# Patient Record
Sex: FEMALE | Race: WHITE | NOT HISPANIC OR LATINO | Employment: OTHER | URBAN - METROPOLITAN AREA
[De-identification: names, ages, dates, MRNs, and addresses within clinical notes are randomized per-mention and may not be internally consistent; named-entity substitution may affect disease eponyms.]

---

## 2023-02-27 LAB — FECAL OCCULT BLD IMMUNOASSAY: NEGATIVE

## 2023-08-19 ASSESSMENT — ENCOUNTER SYMPTOMS
BACK PAIN: 1
NUMBNESS: 0
TINGLING: 0
PARESTHESIAS: 0
WEAKNESS: 0
DYSURIA: 0
FEVER: 0
WEIGHT LOSS: 0
BOWEL INCONTINENCE: 0
PERIANAL NUMBNESS: 0
ABDOMINAL PAIN: 0
HEADACHES: 0
PARESIS: 0
LEG PAIN: 0

## 2023-08-21 ENCOUNTER — APPOINTMENT (OUTPATIENT)
Dept: PRIMARY CARE | Facility: CLINIC | Age: 68
End: 2023-08-21
Payer: MEDICARE

## 2023-08-21 ASSESSMENT — ENCOUNTER SYMPTOMS
HEADACHES: 0
WEIGHT LOSS: 0
ABDOMINAL PAIN: 0
PARESIS: 0
FEVER: 0
WEAKNESS: 0
DYSURIA: 0
BOWEL INCONTINENCE: 0
BACK PAIN: 1
LEG PAIN: 0
NUMBNESS: 0
PERIANAL NUMBNESS: 0
TINGLING: 0
PARESTHESIAS: 0

## 2023-08-22 PROBLEM — C50.919 BREAST CANCER (MULTI): Status: ACTIVE | Noted: 2023-08-22

## 2023-08-22 PROBLEM — E78.5 HYPERLIPIDEMIA: Status: ACTIVE | Noted: 2023-08-22

## 2023-08-22 PROBLEM — K21.9 GERD (GASTROESOPHAGEAL REFLUX DISEASE): Status: ACTIVE | Noted: 2023-08-22

## 2023-08-22 RX ORDER — LETROZOLE 2.5 MG/1
TABLET, FILM COATED ORAL
COMMUNITY
Start: 2023-07-04 | End: 2024-03-13 | Stop reason: SDUPTHER

## 2023-08-22 RX ORDER — ATORVASTATIN CALCIUM 10 MG/1
1 TABLET, FILM COATED ORAL DAILY
COMMUNITY
Start: 2022-08-19 | End: 2023-08-30

## 2023-08-22 RX ORDER — FLUTICASONE PROPIONATE 50 MCG
SPRAY, SUSPENSION (ML) NASAL
COMMUNITY
Start: 2023-02-20 | End: 2023-08-31 | Stop reason: ALTCHOICE

## 2023-08-22 RX ORDER — OMEPRAZOLE 20 MG/1
CAPSULE, DELAYED RELEASE ORAL
COMMUNITY
Start: 2022-08-19 | End: 2023-08-30

## 2023-08-24 ASSESSMENT — ENCOUNTER SYMPTOMS
FEVER: 0
HEADACHES: 0
LEG PAIN: 0
WEIGHT LOSS: 0
PARESIS: 0
BACK PAIN: 1
DYSURIA: 0
BOWEL INCONTINENCE: 0
ABDOMINAL PAIN: 0
TINGLING: 0
PERIANAL NUMBNESS: 0
NUMBNESS: 0
WEAKNESS: 0
PARESTHESIAS: 0

## 2023-08-25 ENCOUNTER — APPOINTMENT (OUTPATIENT)
Dept: PRIMARY CARE | Facility: CLINIC | Age: 68
End: 2023-08-25
Payer: MEDICARE

## 2023-08-29 DIAGNOSIS — K21.9 GASTROESOPHAGEAL REFLUX DISEASE, UNSPECIFIED WHETHER ESOPHAGITIS PRESENT: Primary | ICD-10-CM

## 2023-08-29 DIAGNOSIS — E78.5 HYPERLIPIDEMIA, UNSPECIFIED HYPERLIPIDEMIA TYPE: ICD-10-CM

## 2023-08-29 NOTE — TELEPHONE ENCOUNTER
Requested Prescriptions     Pending Prescriptions Disp Refills    omeprazole (PriLOSEC) 20 mg DR capsule [Pharmacy Med Name: OMEPRAZOL RX CAP 20MG] 90 capsule 1     Sig: TAKE 1 CAPSULE DAILY BEFOREA MEAL    atorvastatin (Lipitor) 10 mg tablet [Pharmacy Med Name: ATORVASTATIN TAB 10MG] 90 tablet 1     Sig: TAKE 1 TABLET DAILY

## 2023-08-29 NOTE — PROGRESS NOTES
Subjective   Patient ID: Mariel Mckeon is a 68 y.o. female who presents for Follow-up (6 month follow up ).    Back Pain  This is a recurrent problem. The current episode started more than 1 year ago. The problem occurs daily. The problem is unchanged. The pain is present in the gluteal, lumbar spine and thoracic spine. The quality of the pain is described as aching. The pain is at a severity of 3/10. The pain is Worse during the day. The symptoms are aggravated by bending and position. Pertinent negatives include no abdominal pain, bladder incontinence, bowel incontinence, chest pain, dysuria, fever, headaches, leg pain, numbness, paresis, paresthesias, pelvic pain, perianal numbness, tingling, weakness or weight loss. Risk factors include history of cancer, lack of exercise, poor posture and sedentary lifestyle.        Had right lower back pian with right LE radiculopathy. She used lidocaine patch which resolved her pain.     Following with oncology for breast cancer     Review of Systems   Constitutional:  Negative for fever and weight loss.   Cardiovascular:  Negative for chest pain.   Gastrointestinal:  Negative for abdominal pain and bowel incontinence.   Genitourinary:  Negative for bladder incontinence, dysuria and pelvic pain.   Musculoskeletal:  Positive for back pain.   Neurological:  Negative for tingling, weakness, numbness, headaches and paresthesias.       Objective   There were no vitals taken for this visit.    Physical Exam  Constitutional:       Appearance: Normal appearance.   HENT:      Head: Normocephalic and atraumatic.   Cardiovascular:      Rate and Rhythm: Normal rate and regular rhythm.      Heart sounds: Normal heart sounds. No murmur heard.     No gallop.   Pulmonary:      Effort: Pulmonary effort is normal. No respiratory distress.      Breath sounds: No wheezing or rales.   Skin:     General: Skin is warm and dry.      Findings: No rash.   Neurological:      Mental Status: She is  alert and oriented to person, place, and time. Mental status is at baseline.   Psychiatric:         Mood and Affect: Mood normal.         Behavior: Behavior normal.         Assessment/Plan        #Breast cancer   -Following with oncology. cont Letrozole. Mamm wnl 1/23/23    #Prediabetes  -Check A1c     #HLD  -Cont atorvastatin 10mg daily.     #GERD  -Cont omeprazole 20mg daily         Influenza: Never   COVID: x2  Prevnar 13: Never  Pneumovax 23: Never  Shingrix: Never  Mamm: 1/23/23  DEXA: 8/2/23 ostepenia  Pap: s/p hysterectomy   Colorectal ca: 2/23/23-FIT  Lung ca screening: NI

## 2023-08-30 RX ORDER — ATORVASTATIN CALCIUM 10 MG/1
10 TABLET, FILM COATED ORAL DAILY
Qty: 90 TABLET | Refills: 1 | Status: SHIPPED | OUTPATIENT
Start: 2023-08-30 | End: 2024-01-22

## 2023-08-30 RX ORDER — OMEPRAZOLE 20 MG/1
CAPSULE, DELAYED RELEASE ORAL
Qty: 90 CAPSULE | Refills: 1 | Status: SHIPPED | OUTPATIENT
Start: 2023-08-30 | End: 2024-01-22

## 2023-08-31 ENCOUNTER — OFFICE VISIT (OUTPATIENT)
Dept: PRIMARY CARE | Facility: CLINIC | Age: 68
End: 2023-08-31
Payer: MEDICARE

## 2023-08-31 VITALS
OXYGEN SATURATION: 97 % | DIASTOLIC BLOOD PRESSURE: 74 MMHG | HEART RATE: 74 BPM | SYSTOLIC BLOOD PRESSURE: 147 MMHG | HEIGHT: 67 IN | BODY MASS INDEX: 26.68 KG/M2 | WEIGHT: 170 LBS

## 2023-08-31 DIAGNOSIS — R73.03 PREDIABETES: Primary | ICD-10-CM

## 2023-08-31 DIAGNOSIS — M85.80 OSTEOPENIA, UNSPECIFIED LOCATION: ICD-10-CM

## 2023-08-31 PROCEDURE — 1036F TOBACCO NON-USER: CPT | Performed by: INTERNAL MEDICINE

## 2023-08-31 PROCEDURE — 1159F MED LIST DOCD IN RCRD: CPT | Performed by: INTERNAL MEDICINE

## 2023-08-31 PROCEDURE — 99214 OFFICE O/P EST MOD 30 MIN: CPT | Performed by: INTERNAL MEDICINE

## 2023-08-31 PROCEDURE — 1160F RVW MEDS BY RX/DR IN RCRD: CPT | Performed by: INTERNAL MEDICINE

## 2023-08-31 RX ORDER — VIT C/E/ZN/COPPR/LUTEIN/ZEAXAN 250MG-90MG
25 CAPSULE ORAL DAILY
COMMUNITY

## 2023-08-31 RX ORDER — CHOLECALCIFEROL (VITAMIN D3) 25 MCG
1000 TABLET,CHEWABLE ORAL DAILY
COMMUNITY

## 2023-08-31 ASSESSMENT — ENCOUNTER SYMPTOMS
LEG PAIN: 0
NUMBNESS: 0
BACK PAIN: 1
PERIANAL NUMBNESS: 0
ABDOMINAL PAIN: 0
TINGLING: 0
HEADACHES: 0
DYSURIA: 0
BOWEL INCONTINENCE: 0
PARESTHESIAS: 0
WEAKNESS: 0
FEVER: 0
WEIGHT LOSS: 0
PARESIS: 0

## 2023-08-31 ASSESSMENT — PATIENT HEALTH QUESTIONNAIRE - PHQ9
1. LITTLE INTEREST OR PLEASURE IN DOING THINGS: NOT AT ALL
2. FEELING DOWN, DEPRESSED OR HOPELESS: NOT AT ALL
SUM OF ALL RESPONSES TO PHQ9 QUESTIONS 1 AND 2: 0

## 2023-09-05 ENCOUNTER — LAB (OUTPATIENT)
Dept: LAB | Facility: LAB | Age: 68
End: 2023-09-05
Payer: MEDICARE

## 2023-09-05 DIAGNOSIS — R73.03 PREDIABETES: ICD-10-CM

## 2023-09-05 DIAGNOSIS — M85.80 OSTEOPENIA, UNSPECIFIED LOCATION: ICD-10-CM

## 2023-09-05 LAB
ANION GAP IN SER/PLAS: 13 MMOL/L (ref 10–20)
CALCIUM (MG/DL) IN SER/PLAS: 10.1 MG/DL (ref 8.6–10.3)
CARBON DIOXIDE, TOTAL (MMOL/L) IN SER/PLAS: 26 MMOL/L (ref 21–32)
CHLORIDE (MMOL/L) IN SER/PLAS: 108 MMOL/L (ref 98–107)
CREATININE (MG/DL) IN SER/PLAS: 0.94 MG/DL (ref 0.5–1.05)
GFR FEMALE: 66 ML/MIN/1.73M2
GLUCOSE (MG/DL) IN SER/PLAS: 105 MG/DL (ref 74–99)
POTASSIUM (MMOL/L) IN SER/PLAS: 4.3 MMOL/L (ref 3.5–5.3)
SODIUM (MMOL/L) IN SER/PLAS: 143 MMOL/L (ref 136–145)
UREA NITROGEN (MG/DL) IN SER/PLAS: 11 MG/DL (ref 6–23)

## 2023-09-05 PROCEDURE — 80048 BASIC METABOLIC PNL TOTAL CA: CPT

## 2023-09-05 PROCEDURE — 36415 COLL VENOUS BLD VENIPUNCTURE: CPT

## 2023-09-05 PROCEDURE — 82306 VITAMIN D 25 HYDROXY: CPT

## 2023-09-05 PROCEDURE — 83036 HEMOGLOBIN GLYCOSYLATED A1C: CPT

## 2023-09-06 LAB
CALCIDIOL (25 OH VITAMIN D3) (NG/ML) IN SER/PLAS: 65 NG/ML
ESTIMATED AVERAGE GLUCOSE FOR HBA1C: 126 MG/DL
HEMOGLOBIN A1C/HEMOGLOBIN TOTAL IN BLOOD: 6 %

## 2023-09-21 VITALS — WEIGHT: 169.09 LBS | HEIGHT: 66 IN | BODY MASS INDEX: 27.18 KG/M2

## 2023-09-21 DIAGNOSIS — C50.112 MALIGNANT NEOPLASM OF CENTRAL PORTION OF LEFT BREAST IN FEMALE, ESTROGEN RECEPTOR POSITIVE (MULTI): Primary | ICD-10-CM

## 2023-09-21 DIAGNOSIS — Z17.0 MALIGNANT NEOPLASM OF CENTRAL PORTION OF LEFT BREAST IN FEMALE, ESTROGEN RECEPTOR POSITIVE (MULTI): Primary | ICD-10-CM

## 2023-10-09 ENCOUNTER — LAB (OUTPATIENT)
Dept: LAB | Facility: LAB | Age: 68
End: 2023-10-09
Payer: MEDICARE

## 2023-10-09 DIAGNOSIS — Z17.0 MALIGNANT NEOPLASM OF CENTRAL PORTION OF LEFT BREAST IN FEMALE, ESTROGEN RECEPTOR POSITIVE (MULTI): ICD-10-CM

## 2023-10-09 DIAGNOSIS — C50.112 MALIGNANT NEOPLASM OF CENTRAL PORTION OF LEFT BREAST IN FEMALE, ESTROGEN RECEPTOR POSITIVE (MULTI): ICD-10-CM

## 2023-10-09 LAB — CANCER AG27-29 SERPL-ACNC: 48.3 U/ML (ref 0–38.6)

## 2023-10-09 PROCEDURE — 36415 COLL VENOUS BLD VENIPUNCTURE: CPT

## 2023-10-09 PROCEDURE — 86300 IMMUNOASSAY TUMOR CA 15-3: CPT

## 2023-10-10 ENCOUNTER — TELEPHONE (OUTPATIENT)
Dept: HEMATOLOGY/ONCOLOGY | Facility: CLINIC | Age: 68
End: 2023-10-10
Payer: MEDICARE

## 2023-12-29 ENCOUNTER — APPOINTMENT (OUTPATIENT)
Dept: RADIOLOGY | Facility: HOSPITAL | Age: 68
End: 2023-12-29
Payer: MEDICARE

## 2023-12-29 ENCOUNTER — HOSPITAL ENCOUNTER (EMERGENCY)
Facility: HOSPITAL | Age: 68
Discharge: HOME | End: 2023-12-29
Payer: MEDICARE

## 2023-12-29 VITALS
RESPIRATION RATE: 17 BRPM | WEIGHT: 169 LBS | TEMPERATURE: 97.5 F | SYSTOLIC BLOOD PRESSURE: 148 MMHG | DIASTOLIC BLOOD PRESSURE: 78 MMHG | BODY MASS INDEX: 27.16 KG/M2 | HEART RATE: 74 BPM | OXYGEN SATURATION: 97 % | HEIGHT: 66 IN

## 2023-12-29 DIAGNOSIS — M54.41 RIGHT-SIDED LOW BACK PAIN WITH RIGHT-SIDED SCIATICA, UNSPECIFIED CHRONICITY: Primary | ICD-10-CM

## 2023-12-29 LAB
FLUAV RNA RESP QL NAA+PROBE: NOT DETECTED
FLUBV RNA RESP QL NAA+PROBE: NOT DETECTED
SARS-COV-2 RNA RESP QL NAA+PROBE: NOT DETECTED

## 2023-12-29 PROCEDURE — 99284 EMERGENCY DEPT VISIT MOD MDM: CPT | Mod: 25 | Performed by: NURSE PRACTITIONER

## 2023-12-29 PROCEDURE — 87636 SARSCOV2 & INF A&B AMP PRB: CPT | Performed by: NURSE PRACTITIONER

## 2023-12-29 PROCEDURE — 99283 EMERGENCY DEPT VISIT LOW MDM: CPT | Mod: 25 | Performed by: NURSE PRACTITIONER

## 2023-12-29 PROCEDURE — 2500000002 HC RX 250 W HCPCS SELF ADMINISTERED DRUGS (ALT 637 FOR MEDICARE OP, ALT 636 FOR OP/ED): Performed by: NURSE PRACTITIONER

## 2023-12-29 PROCEDURE — 99284 EMERGENCY DEPT VISIT MOD MDM: CPT

## 2023-12-29 PROCEDURE — 2500000004 HC RX 250 GENERAL PHARMACY W/ HCPCS (ALT 636 FOR OP/ED): Performed by: NURSE PRACTITIONER

## 2023-12-29 PROCEDURE — 71046 X-RAY EXAM CHEST 2 VIEWS: CPT | Mod: FOREIGN READ | Performed by: RADIOLOGY

## 2023-12-29 PROCEDURE — 71046 X-RAY EXAM CHEST 2 VIEWS: CPT

## 2023-12-29 RX ORDER — DIAZEPAM 5 MG/1
5 TABLET ORAL ONCE
Status: COMPLETED | OUTPATIENT
Start: 2023-12-29 | End: 2023-12-29

## 2023-12-29 RX ORDER — KETOROLAC TROMETHAMINE 30 MG/ML
30 INJECTION, SOLUTION INTRAMUSCULAR; INTRAVENOUS ONCE
Status: COMPLETED | OUTPATIENT
Start: 2023-12-29 | End: 2023-12-29

## 2023-12-29 RX ORDER — NAPROXEN 500 MG/1
500 TABLET ORAL 2 TIMES DAILY PRN
Qty: 30 TABLET | Refills: 0 | Status: SHIPPED | OUTPATIENT
Start: 2023-12-29 | End: 2024-01-13

## 2023-12-29 RX ORDER — ORPHENADRINE CITRATE 100 MG/1
100 TABLET, EXTENDED RELEASE ORAL 2 TIMES DAILY PRN
Qty: 20 TABLET | Refills: 0 | Status: SHIPPED | OUTPATIENT
Start: 2023-12-29 | End: 2024-01-08

## 2023-12-29 RX ADMIN — KETOROLAC TROMETHAMINE 30 MG: 30 INJECTION, SOLUTION INTRAMUSCULAR at 10:11

## 2023-12-29 RX ADMIN — DIAZEPAM 5 MG: 5 TABLET ORAL at 10:11

## 2023-12-29 ASSESSMENT — LIFESTYLE VARIABLES
EVER FELT BAD OR GUILTY ABOUT YOUR DRINKING: NO
REASON UNABLE TO ASSESS: NO
HAVE PEOPLE ANNOYED YOU BY CRITICIZING YOUR DRINKING: NO
EVER HAD A DRINK FIRST THING IN THE MORNING TO STEADY YOUR NERVES TO GET RID OF A HANGOVER: NO
HAVE YOU EVER FELT YOU SHOULD CUT DOWN ON YOUR DRINKING: NO

## 2023-12-29 ASSESSMENT — COLUMBIA-SUICIDE SEVERITY RATING SCALE - C-SSRS
6. HAVE YOU EVER DONE ANYTHING, STARTED TO DO ANYTHING, OR PREPARED TO DO ANYTHING TO END YOUR LIFE?: NO
1. IN THE PAST MONTH, HAVE YOU WISHED YOU WERE DEAD OR WISHED YOU COULD GO TO SLEEP AND NOT WAKE UP?: NO
2. HAVE YOU ACTUALLY HAD ANY THOUGHTS OF KILLING YOURSELF?: NO

## 2023-12-29 ASSESSMENT — PAIN SCALES - GENERAL: PAINLEVEL_OUTOF10: 8

## 2023-12-29 ASSESSMENT — PAIN - FUNCTIONAL ASSESSMENT: PAIN_FUNCTIONAL_ASSESSMENT: 0-10

## 2023-12-29 NOTE — ED PROVIDER NOTES
Emergency Department Encounter  [facility] EMERGENCY MEDICINE    Patient: Mariel Mckeon  MRN: [id number]  : 1955  Date of Evaluation: 2023  ED Provider: ALYSSA Lopez      Chief Complaint       Chief Complaint   Patient presents with    Back Pain     Right sided low back/buttock pain after wiping her bottom on the toilet yesterday. Has been taking Tylenol and using heating pad without relief.      [place]    (Location/Symptom, Timing/Onset, Context/Setting, Quality, Duration, Modifying Factors, Severity) Note limiting factors.   Limitations to History: none  Historian: self  Records reviewed: EMR inpatient and outpatient notes, Care Everywhere      Mariel Mckeon is a 68 y.o. female who presents to the emergency department complaining of right lower back pain, has a history of sciatica.  States that she noticed the area yesterday when she was on the toilet trying to wipe her bottom.  Noticed it more today, states that she went to the bathroom and had sharp shooting pain to the right buttocks going down the leg.  Worse with movement.  Took 2 Tylenol.  States that she got warm and flushed due to the pain and had to lay down.  Denies any loss of consciousness.  Denies any chest pain, shortness of breath, abdominal pain, nausea, vomiting.  Denies any changes in bowel or bladder.  Does have a history of breast cancer, not currently undergoing any chemotherapy or radiation, is on oral medication since 2016.  Denies any midline back pain.  Denies any paresthesias, weakness.    ROS:     Review of Systems  14 systems reviewed and otherwise acutely negative except as in the [place].          Past History   History reviewed. No pertinent past medical history.  Past Surgical History:   Procedure Laterality Date    OTHER SURGICAL HISTORY  2022    Hysterectomy    OTHER SURGICAL HISTORY  2022    Tubal ligation    OTHER SURGICAL HISTORY  2022    Lumpectomy    OTHER SURGICAL  HISTORY  08/19/2022    Wrist surgery    OTHER SURGICAL HISTORY  08/19/2022    Eye surgery     Social History     Socioeconomic History    Marital status:      Spouse name: None    Number of children: None    Years of education: None    Highest education level: None   Occupational History    None   Tobacco Use    Smoking status: Never    Smokeless tobacco: Never   Vaping Use    Vaping Use: Never used   Substance and Sexual Activity    Alcohol use: Never    Drug use: Never    Sexual activity: None   Other Topics Concern    None   Social History Narrative    None     Social Determinants of Health     Financial Resource Strain: Not on file   Food Insecurity: Not on file   Transportation Needs: Not on file   Physical Activity: Not on file   Stress: Not on file   Social Connections: Not on file   Intimate Partner Violence: Not on file   Housing Stability: Not on file       Medications/Allergies     Previous Medications    ATORVASTATIN (LIPITOR) 10 MG TABLET    TAKE 1 TABLET DAILY    CALCIUM CARBONATE-VIT D3-MIN (CITRICAL & MINERALS + VIT D) 600 MG CALCIUM- 200 UNIT TABLET    Take 1 tablet by mouth once daily.    CHOLECALCIFEROL (VITAMIN D-3) 25 MCG (1000 UT) CAPSULE    Take 1 capsule (25 mcg) by mouth once daily.    CYANOCOBALAMIN, VITAMIN B-12, 1,000 MCG CAPSULE    Take 1,000 mcg by mouth once daily.    LETROZOLE (FEMARA) 2.5 MG TABLET        MULTIVIT-MIN/IRON/FOLIC/ADT641 (HAIR, SKIN AND NAILS ADVANCED ORAL)        OLMESARTAN (BENICAR) 20 MG TABLET    Take 1 tablet (20 mg) by mouth once daily.    OMEPRAZOLE (PRILOSEC) 20 MG DR CAPSULE    TAKE 1 CAPSULE DAILY BEFOREA MEAL     Allergies   Allergen Reactions    Amoxicillin-Pot Clavulanate Unknown    Triamcinolone Acetonide Unknown        Physical Exam       ED Triage Vitals   Temp Pulse Resp BP   -- -- -- --      SpO2 Temp src Heart Rate Source Patient Position   -- -- -- --      BP Location FiO2 (%)     -- --         Physical Exam    GENERAL:  The patient appears  nourished and normally developed. Vital signs as documented.     HEENT:  Head normocephalic, atraumatic, EOMs intact, PERRLA, Mucous membranes moist. Nares patent without copious rhinorrhea.  No lymphadenopathy.    PULMONARY:  Lungs are clear to auscultation, without any respiratory distress. Able to speak full sentences, no accessory muscle use    CARDIAC:   Normal rate. No murmurs, rubs or gallops    ABDOMEN:  Soft, non distended, non tender, BS positive x 4 quadrants, No rebound or guarding, no peritoneal signs, no CVA tenderness, no masses or organomegaly    MUSCULOSKELETAL:   Able to ambulate, Non edematous, with no obvious deformities. Pulses intact distal    SKIN:   Good color, with no significant rashes.  No pallor.    NEURO:  No obvious neurological deficits, normal sensation and strength bilaterally.  Able to follow commands, NIH 0, CN 2-12 intact.        Diagnostics   Labs:  Labs Reviewed   SARS-COV-2 AND INFLUENZA A/B PCR - Normal       Result Value    Flu A Result Not Detected      Flu B Result Not Detected      Coronavirus 2019, PCR Not Detected      Narrative:     This assay has received FDA Emergency Use Authorization (EUA) and  is only authorized for the duration of time that circumstances exist to justify the authorization of the emergency use of in vitro diagnostic tests for the detection of SARS-CoV-2 virus and/or diagnosis of COVID-19 infection under section 564(b)(1) of the Act, 21 U.S.C. 360bbb-3(b)(1). Testing for SARS-CoV-2 is only recommended for patients who meet current clinical and/or epidemiological criteria as defined by federal, state, or local public health directives. This assay is an in vitro diagnostic nucleic acid amplification test for the qualitative detection of SARS-CoV-2, Influenza A, and Influenza B from nasopharyngeal specimens and has been validated for use at [facility]. Negative results do not preclude COVID-19 infections or Influenza A/B  "infections, and should not be used as the sole basis for diagnosis, treatment, or other management decisions. If Influenza A/B and RSV PCR results are negative, testing for Parainfluenza virus, Adenovirus and Metapneumovirus is routinely performed for [facility] pediatric oncology and intensive care inpatients, and is available on other patients by placing an add-on request.    URINALYSIS WITH REFLEX CULTURE AND MICROSCOPIC    Narrative:     The following orders were created for panel order Urinalysis with Reflex Culture and Microscopic.  Procedure                               Abnormality         Status                     ---------                               -----------         ------                     Urinalysis with Reflex C...[[id number]]                                                 Extra Urine Gray Tube[[id number]]                                                         Please view results for these tests on the individual orders.   URINALYSIS WITH REFLEX CULTURE AND MICROSCOPIC   EXTRA URINE GRAY TUBE     Radiographs:  XR chest 2 views   Final Result   No acute process.   Signed by Fransico Glover MD                Assessment   In brief, Mariel Mkceon is a 68 y.o. female who presented to the emergency department with right lower back pain, worse with movement, worse with moving the right leg with a history of sciatica    Plan   Toradol, Valium    Differentials   Sciatica  Lumbar strain  Fracture    ED Course     Diagnoses as of 12/29/23 1329   Right-sided low back pain with right-sided sciatica, unspecified chronicity       Visit Vitals  /73   Pulse 100   Temp 36.4 °C (97.5 °F)   Resp 18   Ht 1.676 m (5' 6\")   Wt 76.7 kg (169 lb)   SpO2 94%   BMI 27.28 kg/m²   Smoking Status Never   BSA 1.89 m²       Medications   ketorolac (Toradol) injection 30 mg (30 mg intramuscular Given 12/29/23 1011)   diazePAM (Valium) tablet 5 mg (5 mg oral Given 12/29/23 1011)       Plan of care discussed, patient is stable " appearing, given Valium and Toradol with improvement of symptoms.  On reevaluation patient reports improvement but is also complaining of cough for the last few days, chest x-ray was ordered as well as COVID swab and flu swab which were negative.  Patient will be discharged home in stable condition, given a prescription for NSAIDs, muscle relaxants, educated on any worsening signs and symptoms to return to the emergency department, patient is agreeable to above plan and is stable for discharge home      Final Impression      1. Right-sided low back pain with right-sided sciatica, unspecified chronicity          DISPOSITION  Disposition: Discharge  Patient condition is: Stable    Comment: Please note this report has been produced using speech recognition software and may contain errors related to that system including errors in grammar, punctuation, and spelling, as well as words and phrases that may be inappropriate.  If there are any questions or concerns please feel free to contact the dictating provider for clarification.    ALYSSA Lopez APRN-CNP  12/29/23 3475

## 2023-12-30 DIAGNOSIS — M54.30 SCIATICA, UNSPECIFIED LATERALITY: Primary | ICD-10-CM

## 2024-01-22 DIAGNOSIS — K21.9 GASTROESOPHAGEAL REFLUX DISEASE, UNSPECIFIED WHETHER ESOPHAGITIS PRESENT: ICD-10-CM

## 2024-01-22 DIAGNOSIS — E78.5 HYPERLIPIDEMIA, UNSPECIFIED HYPERLIPIDEMIA TYPE: ICD-10-CM

## 2024-01-22 RX ORDER — ATORVASTATIN CALCIUM 10 MG/1
10 TABLET, FILM COATED ORAL DAILY
Qty: 90 TABLET | Refills: 1 | Status: SHIPPED | OUTPATIENT
Start: 2024-01-22 | End: 2024-03-16 | Stop reason: SDUPTHER

## 2024-01-22 RX ORDER — OMEPRAZOLE 20 MG/1
CAPSULE, DELAYED RELEASE ORAL
Qty: 90 CAPSULE | Refills: 1 | Status: SHIPPED | OUTPATIENT
Start: 2024-01-22

## 2024-01-26 ENCOUNTER — HOSPITAL ENCOUNTER (OUTPATIENT)
Dept: RADIOLOGY | Facility: HOSPITAL | Age: 69
Discharge: HOME | End: 2024-01-26
Payer: MEDICARE

## 2024-01-26 DIAGNOSIS — C50.212 MALIGNANT NEOPLASM OF UPPER-INNER QUADRANT OF LEFT FEMALE BREAST (MULTI): ICD-10-CM

## 2024-01-26 PROCEDURE — 77063 BREAST TOMOSYNTHESIS BI: CPT | Mod: BILATERAL PROCEDURE | Performed by: RADIOLOGY

## 2024-01-26 PROCEDURE — 77067 SCR MAMMO BI INCL CAD: CPT

## 2024-01-26 PROCEDURE — 77067 SCR MAMMO BI INCL CAD: CPT | Mod: BILATERAL PROCEDURE | Performed by: RADIOLOGY

## 2024-03-06 ENCOUNTER — APPOINTMENT (OUTPATIENT)
Dept: PRIMARY CARE | Facility: CLINIC | Age: 69
End: 2024-03-06
Payer: MEDICARE

## 2024-03-06 PROBLEM — R73.03 PREDIABETES: Status: ACTIVE | Noted: 2023-09-05

## 2024-03-06 PROBLEM — M85.80 OTHER SPECIFIED DISORDERS OF BONE DENSITY AND STRUCTURE, UNSPECIFIED SITE: Status: ACTIVE | Noted: 2023-09-05

## 2024-03-06 PROBLEM — R09.89 THROAT CLEARING: Status: ACTIVE | Noted: 2024-03-06

## 2024-03-06 PROBLEM — Z12.11 SPECIAL SCREENING FOR MALIGNANT NEOPLASM OF COLON: Status: ACTIVE | Noted: 2024-03-06

## 2024-03-06 PROBLEM — M54.40 LOW BACK PAIN WITH SCIATICA: Status: ACTIVE | Noted: 2024-03-06

## 2024-03-06 PROBLEM — Z17.0 ESTROGEN RECEPTOR POSITIVE STATUS (ER+): Status: ACTIVE | Noted: 2023-08-22

## 2024-03-06 PROBLEM — Z80.3 FAMILY HISTORY OF MALIGNANT NEOPLASM OF BREAST: Status: ACTIVE | Noted: 2024-03-06

## 2024-03-06 PROBLEM — M85.80 OSTEOPENIA: Status: ACTIVE | Noted: 2024-03-06

## 2024-03-06 RX ORDER — FLUTICASONE PROPIONATE 50 MCG
SPRAY, SUSPENSION (ML) NASAL
COMMUNITY
Start: 2023-02-20 | End: 2024-03-07 | Stop reason: ALTCHOICE

## 2024-03-07 ENCOUNTER — OFFICE VISIT (OUTPATIENT)
Dept: PRIMARY CARE | Facility: CLINIC | Age: 69
End: 2024-03-07
Payer: MEDICARE

## 2024-03-07 VITALS
HEIGHT: 66 IN | DIASTOLIC BLOOD PRESSURE: 83 MMHG | OXYGEN SATURATION: 96 % | WEIGHT: 175 LBS | HEART RATE: 88 BPM | BODY MASS INDEX: 28.12 KG/M2 | SYSTOLIC BLOOD PRESSURE: 134 MMHG

## 2024-03-07 DIAGNOSIS — Z12.11 COLON CANCER SCREENING: ICD-10-CM

## 2024-03-07 DIAGNOSIS — M54.31 SCIATICA OF RIGHT SIDE: ICD-10-CM

## 2024-03-07 DIAGNOSIS — R73.03 PREDIABETES: ICD-10-CM

## 2024-03-07 DIAGNOSIS — E78.5 HYPERLIPIDEMIA, UNSPECIFIED HYPERLIPIDEMIA TYPE: Primary | ICD-10-CM

## 2024-03-07 PROCEDURE — 1125F AMNT PAIN NOTED PAIN PRSNT: CPT | Performed by: INTERNAL MEDICINE

## 2024-03-07 PROCEDURE — 1160F RVW MEDS BY RX/DR IN RCRD: CPT | Performed by: INTERNAL MEDICINE

## 2024-03-07 PROCEDURE — 1159F MED LIST DOCD IN RCRD: CPT | Performed by: INTERNAL MEDICINE

## 2024-03-07 PROCEDURE — 99214 OFFICE O/P EST MOD 30 MIN: CPT | Performed by: INTERNAL MEDICINE

## 2024-03-07 PROCEDURE — 1036F TOBACCO NON-USER: CPT | Performed by: INTERNAL MEDICINE

## 2024-03-07 PROCEDURE — 1124F ACP DISCUSS-NO DSCNMKR DOCD: CPT | Performed by: INTERNAL MEDICINE

## 2024-03-07 RX ORDER — NAPROXEN 500 MG/1
500 TABLET ORAL 2 TIMES DAILY PRN
Qty: 60 TABLET | Refills: 0 | Status: SHIPPED | OUTPATIENT
Start: 2024-03-07 | End: 2024-04-06

## 2024-03-07 ASSESSMENT — PATIENT HEALTH QUESTIONNAIRE - PHQ9
1. LITTLE INTEREST OR PLEASURE IN DOING THINGS: NOT AT ALL
SUM OF ALL RESPONSES TO PHQ9 QUESTIONS 1 AND 2: 0
2. FEELING DOWN, DEPRESSED OR HOPELESS: NOT AT ALL

## 2024-03-07 NOTE — PROGRESS NOTES
Subjective   Patient ID: Mariel Mckeon is a 68 y.o. y/o female who presents to with a chief complaint of 6 month follow up.     HPI  Recently was in the hospital around end of December with low back/buttock pain. Diagnosed with sciatica and got a shot/muscle relaxer. Has been taking naproxen 500mg BID PRN. Unable to stretch and has been using massager for pain relief.     Has been measuring blood pressure at home with wrist machine. 130s at home. Has not been taking omesartan.     Objective   Vitals:    03/07/24 1024   BP: 134/83   Pulse: 88   SpO2: 96%       Physical Exam  Vitals reviewed.   Eyes:      Extraocular Movements: Extraocular movements intact.      Conjunctiva/sclera: Conjunctivae normal.      Pupils: Pupils are equal, round, and reactive to light.   Cardiovascular:      Rate and Rhythm: Normal rate and regular rhythm.      Heart sounds: Normal heart sounds. No murmur heard.     No gallop.   Pulmonary:      Effort: Pulmonary effort is normal. No respiratory distress.      Breath sounds: Normal breath sounds. No wheezing.   Abdominal:      General: Abdomen is flat. There is no distension.      Palpations: Abdomen is soft.      Tenderness: There is no abdominal tenderness.   Musculoskeletal:      Right lower leg: No edema.      Left lower leg: No edema.   Skin:     General: Skin is warm and dry.   Neurological:      Mental Status: She is alert. Mental status is at baseline.   Psychiatric:         Mood and Affect: Mood normal.         Behavior: Behavior normal.         Assessment/Plan   Problem List Items Addressed This Visit    #Sciatica  -currently on naproxen 500mg BID PRN   -declining physical therapy -- will try home exercises     #Breast cancer   -Following with oncology. cont Letrozole. Mamm wnl 12/29/23     #Prediabetes  -A1C 6.0%  -will check A1C today     #HLD  -Cont atorvastatin 10mg daily  -check lipid panel today     #GERD  -Cont omeprazole 20mg daily     #Osteoporosis  -no longer on Fosamax,  declined Prolia  -DEXA due May 2024  -cont vit d / calcium supplements     Influenza: Never   COVID: x2  Prevnar 13: Never  Pneumovax 23: Never  Shingrix: Never  Mamm: 1/26/24  DEXA: 8/2/23 ostepenia  Pap: s/p hysterectomy   Colorectal ca: 2/23/23-FIT (negative)   Lung ca screening: YE Bledsoe, DO  Internal Medicine PGY-I

## 2024-03-07 NOTE — PATIENT INSTRUCTIONS
Get fasting blood work prior to oncology appointment.     Ask Dr. Mitchell - youtube - for sciatica stretches.      Physical Therapy main number [phone]  [facility] ([facility]) [phone]

## 2024-03-13 ENCOUNTER — APPOINTMENT (OUTPATIENT)
Dept: LAB | Facility: HOSPITAL | Age: 69
End: 2024-03-13
Payer: MEDICARE

## 2024-03-13 ENCOUNTER — OFFICE VISIT (OUTPATIENT)
Dept: HEMATOLOGY/ONCOLOGY | Facility: CLINIC | Age: 69
End: 2024-03-13
Payer: MEDICARE

## 2024-03-13 VITALS
OXYGEN SATURATION: 97 % | DIASTOLIC BLOOD PRESSURE: 82 MMHG | TEMPERATURE: 97.5 F | HEIGHT: 66 IN | BODY MASS INDEX: 28.01 KG/M2 | HEART RATE: 78 BPM | RESPIRATION RATE: 18 BRPM | WEIGHT: 174.27 LBS | SYSTOLIC BLOOD PRESSURE: 132 MMHG

## 2024-03-13 DIAGNOSIS — Z17.0 MALIGNANT NEOPLASM OF AREOLA OF LEFT BREAST IN FEMALE, ESTROGEN RECEPTOR POSITIVE (MULTI): Primary | ICD-10-CM

## 2024-03-13 DIAGNOSIS — R73.03 PREDIABETES: ICD-10-CM

## 2024-03-13 DIAGNOSIS — C50.011 BILATERAL MALIGNANT NEOPLASM OF AREOLA OF BREAST IN FEMALE, UNSPECIFIED ESTROGEN RECEPTOR STATUS (MULTI): Primary | ICD-10-CM

## 2024-03-13 DIAGNOSIS — C50.012 MALIGNANT NEOPLASM OF AREOLA OF LEFT BREAST IN FEMALE, ESTROGEN RECEPTOR POSITIVE (MULTI): Primary | ICD-10-CM

## 2024-03-13 DIAGNOSIS — C50.012 BILATERAL MALIGNANT NEOPLASM OF AREOLA OF BREAST IN FEMALE, UNSPECIFIED ESTROGEN RECEPTOR STATUS (MULTI): Primary | ICD-10-CM

## 2024-03-13 DIAGNOSIS — E78.5 HYPERLIPIDEMIA, UNSPECIFIED HYPERLIPIDEMIA TYPE: ICD-10-CM

## 2024-03-13 LAB
ALBUMIN SERPL BCP-MCNC: 4.3 G/DL (ref 3.4–5)
ALP SERPL-CCNC: 89 U/L (ref 33–136)
ALT SERPL W P-5'-P-CCNC: 30 U/L (ref 7–45)
ANION GAP SERPL CALC-SCNC: 15 MMOL/L (ref 10–20)
AST SERPL W P-5'-P-CCNC: 23 U/L (ref 9–39)
BASOPHILS # BLD AUTO: 0.02 X10*3/UL (ref 0–0.1)
BASOPHILS NFR BLD AUTO: 0.4 %
BILIRUB SERPL-MCNC: 0.4 MG/DL (ref 0–1.2)
BUN SERPL-MCNC: 15 MG/DL (ref 6–23)
CALCIUM SERPL-MCNC: 9.2 MG/DL (ref 8.6–10.3)
CANCER AG27-29 SERPL-ACNC: 46.9 U/ML (ref 0–38.6)
CHLORIDE SERPL-SCNC: 108 MMOL/L (ref 98–107)
CHOLEST SERPL-MCNC: 103 MG/DL (ref 0–199)
CHOLESTEROL/HDL RATIO: 4
CO2 SERPL-SCNC: 23 MMOL/L (ref 21–32)
CREAT SERPL-MCNC: 0.88 MG/DL (ref 0.5–1.05)
EGFRCR SERPLBLD CKD-EPI 2021: 72 ML/MIN/1.73M*2
EOSINOPHIL # BLD AUTO: 0.25 X10*3/UL (ref 0–0.7)
EOSINOPHIL NFR BLD AUTO: 4.8 %
ERYTHROCYTE [DISTWIDTH] IN BLOOD BY AUTOMATED COUNT: 12.6 % (ref 11.5–14.5)
EST. AVERAGE GLUCOSE BLD GHB EST-MCNC: 134 MG/DL
GLUCOSE SERPL-MCNC: 150 MG/DL (ref 74–99)
HBA1C MFR BLD: 6.3 %
HCT VFR BLD AUTO: 44.2 % (ref 36–46)
HDLC SERPL-MCNC: 25.9 MG/DL
HGB BLD-MCNC: 14.5 G/DL (ref 12–16)
IMM GRANULOCYTES # BLD AUTO: 0.01 X10*3/UL (ref 0–0.7)
IMM GRANULOCYTES NFR BLD AUTO: 0.2 % (ref 0–0.9)
LDLC SERPL CALC-MCNC: 13 MG/DL
LYMPHOCYTES # BLD AUTO: 1.51 X10*3/UL (ref 1.2–4.8)
LYMPHOCYTES NFR BLD AUTO: 28.8 %
MCH RBC QN AUTO: 31.4 PG (ref 26–34)
MCHC RBC AUTO-ENTMCNC: 32.8 G/DL (ref 32–36)
MCV RBC AUTO: 96 FL (ref 80–100)
MONOCYTES # BLD AUTO: 0.43 X10*3/UL (ref 0.1–1)
MONOCYTES NFR BLD AUTO: 8.2 %
NEUTROPHILS # BLD AUTO: 3.03 X10*3/UL (ref 1.2–7.7)
NEUTROPHILS NFR BLD AUTO: 57.6 %
NON HDL CHOLESTEROL: 77 MG/DL (ref 0–149)
PLATELET # BLD AUTO: 168 X10*3/UL (ref 150–450)
POTASSIUM SERPL-SCNC: 4 MMOL/L (ref 3.5–5.3)
PROT SERPL-MCNC: 6.5 G/DL (ref 6.4–8.2)
RBC # BLD AUTO: 4.62 X10*6/UL (ref 4–5.2)
SODIUM SERPL-SCNC: 142 MMOL/L (ref 136–145)
TRIGL SERPL-MCNC: 319 MG/DL (ref 0–149)
VLDL: 64 MG/DL (ref 0–40)
WBC # BLD AUTO: 5.3 X10*3/UL (ref 4.4–11.3)

## 2024-03-13 PROCEDURE — 84075 ASSAY ALKALINE PHOSPHATASE: CPT | Performed by: NURSE PRACTITIONER

## 2024-03-13 PROCEDURE — 83036 HEMOGLOBIN GLYCOSYLATED A1C: CPT | Mod: GEALAB | Performed by: NURSE PRACTITIONER

## 2024-03-13 PROCEDURE — 1126F AMNT PAIN NOTED NONE PRSNT: CPT | Performed by: NURSE PRACTITIONER

## 2024-03-13 PROCEDURE — 99214 OFFICE O/P EST MOD 30 MIN: CPT | Performed by: NURSE PRACTITIONER

## 2024-03-13 PROCEDURE — 1160F RVW MEDS BY RX/DR IN RCRD: CPT | Performed by: NURSE PRACTITIONER

## 2024-03-13 PROCEDURE — 85025 COMPLETE CBC W/AUTO DIFF WBC: CPT | Performed by: NURSE PRACTITIONER

## 2024-03-13 PROCEDURE — 80061 LIPID PANEL: CPT | Performed by: NURSE PRACTITIONER

## 2024-03-13 PROCEDURE — 36415 COLL VENOUS BLD VENIPUNCTURE: CPT | Performed by: NURSE PRACTITIONER

## 2024-03-13 PROCEDURE — 1036F TOBACCO NON-USER: CPT | Performed by: NURSE PRACTITIONER

## 2024-03-13 PROCEDURE — 1159F MED LIST DOCD IN RCRD: CPT | Performed by: NURSE PRACTITIONER

## 2024-03-13 PROCEDURE — 86300 IMMUNOASSAY TUMOR CA 15-3: CPT | Mod: GEALAB | Performed by: NURSE PRACTITIONER

## 2024-03-13 RX ORDER — LETROZOLE 2.5 MG/1
2.5 TABLET, FILM COATED ORAL DAILY
Qty: 90 TABLET | Refills: 3 | Status: SHIPPED | OUTPATIENT
Start: 2024-03-13 | End: 2025-03-13

## 2024-03-13 ASSESSMENT — COLUMBIA-SUICIDE SEVERITY RATING SCALE - C-SSRS
2. HAVE YOU ACTUALLY HAD ANY THOUGHTS OF KILLING YOURSELF?: NO
1. IN THE PAST MONTH, HAVE YOU WISHED YOU WERE DEAD OR WISHED YOU COULD GO TO SLEEP AND NOT WAKE UP?: NO
6. HAVE YOU EVER DONE ANYTHING, STARTED TO DO ANYTHING, OR PREPARED TO DO ANYTHING TO END YOUR LIFE?: NO

## 2024-03-13 ASSESSMENT — PAIN SCALES - GENERAL: PAINLEVEL: 0-NO PAIN

## 2024-03-13 NOTE — PROGRESS NOTES
Patient ID: Mariel Mckeon is a 68 y.o. female.    Subjective    HPI    Patient is a pleasant 68-year-old  female with history of left invasive ductal carcinoma diagnosed in September 2016.     Patient  moved from [place] where she was followed and treated by Danya Arthur MD.  Her last visit with oncologist in [place] was June 6, 2022.  She was diagnosed with left breast upper inner quadrant invasive ductal carcinoma grade 2 per biopsy in  September 6, 2016 ER 95%, [place] -0% HER2 1+ by IHC but positive by FISH with an average HER2 copy numbers of 6.5 and a ratio of 1.6 Ki-67 was 80 to 90%.  She underwent left lower outer quadrant 6 o'clock position biopsy on October 11, 2016 which showed intraductal  papilloma and atypical ductal hyperplasia and another focus at left lower inner quadrant 7:00 biopsy showed intraductal papilloma.  August 2022 she moved  to [place].     Patient underwent left lumpectomy with a left axillary sentinel lymph node biopsy on November 8, 2016.  Final pathology showed grade 3 invasive ductal carcinoma with micropapillary features tumor measured 3 cm with margins positive for invasive ductal  carcinoma extended into the superior margin.  There was lymphovascular invasion present.  There was high-grade DCIS solid type with comedonecrosis measuring 2.5 cm with a negative margins.      Patient then underwent left breast wider excision on December 8, 2016 as well as left axillary lymph node dissection patient was found to have 5 negative lymph nodes.  Pathology showed breast tissue with biopsy site changes and no residual invasive or  in situ carcinoma.     Patient underwent adjuvant chemotherapy with Taxotere 75mg/m2, Carboplatin AUC 5, Herceptin and Perjita between January 6, 2017 and April 21, 2017.  Beginning cycle 5 of TCHP on March 31, 2017 the dose of Taxotere was reduced to 60mg/m2 and carbo AUC  of 4 due to febrile neutropenia and thrombocytopenia.     Patient completed adjuvant  radiation therapy from 8/15/2017 through 2017     Patient completed total of 12 cycles of Herceptin between May 12, 2017 and 2017     Patient was started on letrozole 2.5 mg daily starting 2017 and discontinued 2019 due to arthralgia and hair thinning.  Patient was started on anastrozole darting from 2019 and discontinued 2019 due to arthralgia.   Patient was then started on exemestane starting 2019.  She has been getting Exemestane on free drug program and that will be ending, so she questions if she still needs to be on antihormonal therapy.  Due to  insurance purposes she will be starting back on letrozole which she has tolerated well.  She confirms she is taking this.   She had a mammogram 23 showed no malignancy.  Patient strained her back playing with a neighbors dog.  She has sciatica and will be undergoing physical therapy.  She is otherwise doing well denies fever, chills, night sweats, decreased appetite, weight loss, chest pain, shortness of breath, nausea, vomiting, diarrhea, constipation, abdominal  pain, or urinary symptoms.  Denies any lumps or bumps.  She previously stated  she has some mild neuropathy on the bottom of her feet and in her big toes, but not a complaint today's visit.       Patient has a history of osteoporosis has been on Fosamax and self discontinued and declined Prolia in the past last DEXA scan on 8/3/23 reviewed osteopenia.  Patient has been taking vitamin D and calcium.     Denies any complaints at this time.           PMH: sinusitis, sciatica, osteoporosis, OA  PSH: left lumpectomy 2016, surgery for tendonitis of right wrist, hysterectomy and tubal ligation. eye surgery  FH: maternal uncle brain cancer     Gyn: , , one miscarriage, 2nd pregancy was identified at the time of hysterectomy. first menarche was at age 11 or 13 yo. menopause in her 30's due to hysterectomy. OCP use in her  "20's for more than 10 years. no HCP.        Objective    BSA: 1.93 meters squared  /82 (BP Location: Right arm, Patient Position: Sitting, BP Cuff Size: Adult)   Pulse 78   Temp 36.4 °C (97.5 °F) (Temporal)   Resp 18   Ht (S) 1.687 m (5' 6.42\")   Wt 79.1 kg (174 lb 4.4 oz)   SpO2 97%   BMI 27.78 kg/m²      Physical Exam  Constitutional:       Appearance: Normal appearance.   Eyes:      Conjunctiva/sclera: Conjunctivae normal.      Pupils: Pupils are equal, round, and reactive to light.   Cardiovascular:      Rate and Rhythm: Normal rate and regular rhythm.      Pulses: Normal pulses.      Heart sounds: Normal heart sounds. No murmur heard.     No gallop.   Pulmonary:      Effort: Pulmonary effort is normal. No respiratory distress.      Breath sounds: Normal breath sounds. No wheezing or rales.   Abdominal:      General: There is no distension.      Palpations: Abdomen is soft.      Tenderness: There is no abdominal tenderness.   Musculoskeletal:         General: No swelling or tenderness.   Lymphadenopathy:      Cervical: No cervical adenopathy.   Skin:     Coloration: Skin is not jaundiced or pale.      Findings: No bruising or erythema.   Neurological:      General: No focal deficit present.      Motor: No weakness.   Psychiatric:      Comments: Left breast with nipple inversion since time of surgery.  No palpable abnormalities of either breast, no regional lymphadenopathy     Performance Status:  Asymptomatic      Assessment/Plan   Left invasive ductal carcinoma s/p lumpectomy 11/8/16 of 3cm tumor with positive margins requiring reexcision with 0/5LN.  She was treated with Taxotere, Carboplatin,  Herceptin and Perjita X 6 completed 4/21/17 and finished the year of Herceptin.  She completed radiation therapy 7/7/2017.  She was started on antihormonal therapy 8/7/17 and switched to anastrazole until 10/24/19, both intolerance arthralgia.  She has  been on exemestane since 10/25/19.  She will be set " switching to letrozole due to insurance request.  She will no longer be getting free exemestane. I would respect Dr Arthur suggestion to continue a full 10 years of antihormonal therapy as discussed  with patient prior to her moving to [place].         Mammogram completed January 2023 shows no malignancy  Osteopenia declined Prolia,  DEXA due Aug 2025  Plan:    Follow-up: 6 months  DEXA Aug 2025  Plan to continue full 10 years of AI, letrozole  History of lymphedema not noted today, continue with sleeve as needed.             Diagnoses and all orders for this visit:  Malignant neoplasm of areola of left breast in female, estrogen receptor positive (CMS/HCC)  -     CBC and Auto Differential; Future  -     Comprehensive Metabolic Panel; Future  -     Cancer Antigen 27-29; Future  -     CBC and Auto Differential; Future  -     Comprehensive Metabolic Panel; Future  -     Cancer Antigen 27-29; Future  -     CBC and Auto Differential; Future  -     Comprehensive Metabolic Panel; Future  -     Cancer Antigen 27-29; Future  -     BI mammo bilateral screening tomosynthesis; Future  -     Clinic Appointment Request Follow up; Future  Prediabetes  -     Hemoglobin A1c  Hyperlipidemia, unspecified hyperlipidemia type  -     Lipid panel           Priscilla Gomez PA-C

## 2024-03-14 PROCEDURE — 82274 ASSAY TEST FOR BLOOD FECAL: CPT | Performed by: INTERNAL MEDICINE

## 2024-03-16 DIAGNOSIS — E78.5 HYPERLIPIDEMIA, UNSPECIFIED HYPERLIPIDEMIA TYPE: ICD-10-CM

## 2024-03-16 RX ORDER — ATORVASTATIN CALCIUM 10 MG/1
TABLET, FILM COATED ORAL
Qty: 45 TABLET | Refills: 1 | Status: SHIPPED | OUTPATIENT
Start: 2024-03-16

## 2024-03-19 ENCOUNTER — DOCUMENTATION (OUTPATIENT)
Dept: HEMATOLOGY/ONCOLOGY | Facility: CLINIC | Age: 69
End: 2024-03-19
Payer: MEDICARE

## 2024-03-19 LAB — HEMOCCULT STL QL IA: NEGATIVE

## 2024-04-11 ENCOUNTER — EVALUATION (OUTPATIENT)
Dept: PHYSICAL THERAPY | Facility: CLINIC | Age: 69
End: 2024-04-11
Payer: MEDICARE

## 2024-04-11 DIAGNOSIS — M54.40 LOW BACK PAIN WITH SCIATICA, SCIATICA LATERALITY UNSPECIFIED, UNSPECIFIED BACK PAIN LATERALITY, UNSPECIFIED CHRONICITY: ICD-10-CM

## 2024-04-11 DIAGNOSIS — M54.31 SCIATICA OF RIGHT SIDE: Primary | ICD-10-CM

## 2024-04-11 PROCEDURE — 97161 PT EVAL LOW COMPLEX 20 MIN: CPT | Mod: GP

## 2024-04-11 ASSESSMENT — ENCOUNTER SYMPTOMS
LOSS OF SENSATION IN FEET: 1
OCCASIONAL FEELINGS OF UNSTEADINESS: 0
DEPRESSION: 0

## 2024-04-11 NOTE — PROGRESS NOTES
Physical Therapy    Physical Therapy Evaluation and Treatment      Patient Name: Mariel Mckeon  MRN: [id number]  Today's Date: 4/12/2024  Time Calculation  Start Time: 0930  Stop Time: 1012  Time Calculation (min): 42 min     Insurance:  $40 COPAY PER VISIT/ 100% COVERGE/ NO AUTH/ [place] VISITS/ PER [facility] REF# [id number]    Assessment:  Patient presents to PT with history of  previous episode of R sciatica 12/29/23 which gradually resolved following injection in ED and Naproxin.  She reports she has been painfree for 3 weeks.   She reports a sedentary lifestyle, sitting most of the day watching TV or playing on her phone.  She presents with faulty postural alignment, minimal weakness in core and postural muscles, and history of previous episodes of LBP and sciatica. The only functional limitations patient reports at this time are stooping down to  objects off the floor and sitting back on heels. Patient would benefit from PT for 1-2 visits to instruct in appropriate HEP for strengthening and core stabilization, and education in proper posture, back care and body mechanics to limit risk of reinjury.     Plan:  Plan to see patient for 1-2 visits for instruction in HEP for strengthening and stretching, as well as education in back care and body mechanics to protect spine and decrease risk of reinjury.     OP PT Plan  Treatment/Interventions: Education/ Instruction, Therapeutic activities, Therapeutic exercises  PT Plan: Skilled PT  PT Frequency:  (1-2 visits)  Rehab Potential: Excellent  Plan of Care Agreement: Patient    Current Problem:   1. Sciatica of right side  Referral to Physical Therapy    Follow Up In Physical Therapy      2. Low back pain with sciatica, sciatica laterality unspecified, unspecified back pain laterality, unspecified chronicity        Precautions:  Precautions  STEADI Fall Risk Score (The score of 4 or more indicates an increased risk of falling): 3    General:  General  Reason for  "Referral: Sciatica Right side M54.31  Referred By: Ayan Chowdhury DO   Onset 3/7/24  Visit 1     Subjective:  Patient reports problems with right sciatica for a long time.  Had an episode on 12/29/23 and had to call EMS and was taken to [facility] ED.  Had an injection and script for Naproxin.  Also had a chest xrays to rule out pneumonia which was negative.   Work/mechanical stresses: Retired office work  Leisure/mechanical stresses: walking daily with neighbor, plays on phone quite a bit while sitting, \"I sit a lot and watch TV most of the day\".   Prior to onset the pt was able to complete functional activities without limitation.  Functional disability from present episode: can't sit back on right leg in kneeling, stooping to pick things up off the floor.   Present symptoms: Intermittent ache right buttock into thigh.  Takes half a naproxin and it goes away.  Present since: 12/29/23 and present course better   Commenced as a result of:    no apparent reason  Symptoms at onset: right buttock into right posterior thigh   Pain ranges from (VAS score 0-10): 0/10 at present; 0/10 at worst the last 3 weeks.  Pt describes pain as: dull pain   Symptoms are worse with: when I have the pain everything bothers me, but no pain for 3 weeks  Symptoms are better with:  just goes away on its own.  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     Patient goals:   Don't have any more episodes of pain.   Home living=lives alone in " mobile home. No concerns reported.     Objective Findings:   Posture: poor sitting posture   Correction: NE  Lateral shift: nil  Other observations: mod FH, B rounded shoulders, increased thoracic kyphosis, PPT, flattened lumbar lordosis.    Neuro screen:    Myotomes: WFL  Dermatomes: neuropathy in B feet, but BLE intact to light touch  Dural tests: negative B SLR and negative B slump test   Reflexes: not tested     Lumbar spine movement Loss - Nil/Min/Mod/Max limit or degrees and pain  Flexion: WNL pulling sensation during motion  Extension: WFL, no pain  R SGIS: WFL no pain   L SGIS: WFL no pain    Repeated Movement Testing -  During/after/mechanical response:  Pre-test Sx in standing: no symptoms  RFIS: x 10 reps, no pain  LORNE: x 10 reps, no pain     Pre-test Sx in lying: no symptoms  RFIL: x 10 reps, no pain  REIL: x 1 reps, no pain in back or RLE, patient states she is unable to perform more reps because of tightness in stomach and both shoulders.   ROM:  B Hip  ROM WFL and painfree    Flexibility:  Hamstrings B good  Piriformis B good  Quads R fair  Gastrocs B to neutral     Strength:  Hip flexion supine B 5/5  Hip abduction B 5/5  Hip extension  B 3-/5  Bridges 75%  Knee extension B 5/5  Knee flexion B 5/5  Ankle DF B 5/5  Ankle PF B 5/5  DLS fair  Abdominals  fair  Weakness in postural muscles as evident by poor posture  Outcome Measure:  LEFS 59/80    Treatment:   Evaluation only this date.     EDUCATION:  Outpatient Education  Individual(s) Educated: Patient  Education Provided: POC, Posture  Diagnosis and Precautions: h/o Breast CA  Patient/Caregiver Demonstrated Understanding: yes  Plan of Care Discussed and Agreed Upon: yes  Patient Response to Education: Patient/Caregiver Verbalized Understanding of Information        Goals:  Active       PT Problem       HEP       Start:  04/11/24    Expected End:  06/07/24       Patient will demonstrate independence and compliance with safe and recommended  HEP in  order to maximize and maintain functional gains made in physical therapy.          Back care and body mechanics       Start:  04/11/24    Expected End:  06/07/24       Patient will demonstrate/verbalize an understanding of correct postural alignment, body mechanics and back care principles for joint and spinal protection during ADL/IADLs and household/leisure activities in order to decrease the risk of reinjury.          Outcome       Start:  04/11/24    Expected End:  06/07/24       Patient will improve outcome measures score on  LEFS by 9 pts  to show a clinically significant improvement  in functional mobility.

## 2024-05-02 ENCOUNTER — TREATMENT (OUTPATIENT)
Dept: PHYSICAL THERAPY | Facility: CLINIC | Age: 69
End: 2024-05-02
Payer: MEDICARE

## 2024-05-02 DIAGNOSIS — M54.31 SCIATICA OF RIGHT SIDE: ICD-10-CM

## 2024-05-02 PROCEDURE — 97110 THERAPEUTIC EXERCISES: CPT | Mod: GP

## 2024-05-02 NOTE — PROGRESS NOTES
Physical Therapy    Physical Therapy Treatment/PT Discharge Summary    Patient Name: Mariel Mckeon  MRN: [id number]  Today's Date: 5/2/2024  Time Calculation  Start Time: 0932  Stop Time: 1017  Time Calculation (min): 45 min      Assessment:  Patient reports no return of pain since injection in ED on 12/29/24.  Patient tolerated treatment well without increased complaints of pain during or after session.  She demonstrated good performance of exercises without compensation and with good effort.  She was advised to stop any exercise that causes increased lasting pain or symptoms.  Patient verbalizes understanding and agreement.  Patient also advised to contact MD if symptoms return. PT goals achieved, see below.     Program Notes  STOP any exercise that causes increased lasting pain or symptoms.REMEMBER GOOD POSTURE - SIT AND STAND TALL!!!!       Plan:  Discharge PT to [facility].  Follow up with MD if symptoms return.    Current Problem:   1. Sciatica of right side  Referral to Physical Therapy     Follow Up In Physical Therapy       2. Low back pain with sciatica, sciatica laterality unspecified, unspecified back pain laterality, unspecified chronicity       General  General  Reason for Referral: Sciatica Right side M54.31  Referred By: Ayan Chowdhury DO   Onset 3/7/24  Visit 2    Subjective    Patient reports no long lasting pain since injection in the ED.  She had one episode of min increase in LBP following yardwork, but it went away.  No new complaints.  States she is ready for discharge to [facility] - no pain or restrictions.      Precautions  Precautions  STEADI Fall Risk Score (The score of 4 or more indicates an increased risk of falling): 3  History of lumpectomy L breast 2017 (plus chemo and radiation, 2 surgeries) ,     Pain  0/10 at start and end of session.     Treatments:  Therapeutic exercises 45 mins  Therapeutic exercises to increase trunk and BLE strength to improve posture and spinal protection during functional  "mobility and prevent recurrence.     Hooklying  TA brace with deep breathing 5\" hold x 10 reps  TA brace with B hip adduction ball squeeze 5\" hold x 10 reps  TA brace with B hip abduction with orange band 5\" hold x 10 reps  TA brace with alt R/L bent knee raise with orange band 5\" hold x 10 reps  Bridge with PPT 1 x 5 reps  B shoulder horizontal ABD with palms up/palms down with orange band 2 x 10 reps   R/L UE PNF D2 flexion with orange band 2 x 10 reps  B shoulder ER with orange band 2 x 10 reps     Sidelying  Clamshells R/L 1 x 15 reps  Hip abduction R/L 1 x 10 reps     Written instructions and orange theraband provided to patient.     OP EDUCATION:  Educated in proper postural alignment in sitting and standing as this will influence outcome.  Discussed proper lifting techniques and floor transfers.   Access Code: [email or web address]  URL: [email or web address]  Date: 05/02/2024  Prepared by: Shanel France    Program Notes  STOP any exercise that causes increased lasting pain or symptoms.REMEMBER GOOD POSTURE - SIT AND STAND TALL!!!!    Exercises  - Supine Transversus Abdominis Bracing - Hands on Stomach  - 1 x daily - 5 x weekly - 2 sets - 10 reps - 5\" hold  - Supine Hip Adduction Isometric with Ball  - 1 x daily - 5 x weekly - 2 sets - 10 reps - 5\" hold  - Hooklying Clamshell with Resistance  - 1 x daily - 5 x weekly - 2 sets - 10 reps - 5\" hold  - Supine March  - 1 x daily - 5 x weekly - 2 sets - 10 reps - 5\" hold  - Supine March with Resistance Band  - 1 x daily - 5 x weekly - 2 sets - 10 reps - 5\" hold  - Supine Bridge  - 1 x daily - 5 x weekly - 1 sets - 10 reps  - Supine Shoulder Horizontal Abduction with Resistance  - 1 x daily - 5 x weekly - 2 sets - 10 reps  - Supine PNF D2 Flexion with Resistance  - 1 x daily - 5 x weekly - 2 sets - 10 reps  - Supine Shoulder External Rotation with Resistance  - 1 x daily - 5 x weekly - 2 sets - 10 reps  - Sidelying Hip Abduction  - 1 x daily - 5 x weekly - " 2 sets - 10 reps  - Clamshell  - 1 x daily - 5 x weekly - 2 sets - 10 reps  - Standing Hip Abduction with Counter Support  - 1 x daily - 5 x weekly - 2 sets - 10 reps  - Standing March with Counter Support  - 1 x daily - 5 x weekly - 2 sets - 10 reps       Goals:  Resolved       PT Problem       HEP (Met)       Start:  04/11/24    Expected End:  06/07/24    Resolved:  05/02/24    Patient will demonstrate independence and compliance with safe and recommended  HEP in order to maximize and maintain functional gains made in physical therapy.          Back care and body mechanics (Met)       Start:  04/11/24    Expected End:  06/07/24    Resolved:  05/02/24    Patient will demonstrate/verbalize an understanding of correct postural alignment, body mechanics and back care principles for joint and spinal protection during ADL/IADLs and household/leisure activities in order to decrease the risk of reinjury.       Goal Note       Educated in above principles and demonstrates understanding and performance of correct sitting posture in sitting and standing.               Outcome (Adequate for Discharge)       Start:  04/11/24    Expected End:  06/07/24       Patient will improve outcome measures score on  LEFS by 9 pts  to show a clinically significant improvement  in functional mobility.       Goal Note       Did not retest this date.  However, patient reports no pain or restrictions.

## 2024-07-15 ENCOUNTER — DOCUMENTATION (OUTPATIENT)
Dept: PHYSICAL THERAPY | Facility: CLINIC | Age: 69
End: 2024-07-15
Payer: MEDICARE

## 2024-07-22 DIAGNOSIS — K21.9 GASTROESOPHAGEAL REFLUX DISEASE, UNSPECIFIED WHETHER ESOPHAGITIS PRESENT: ICD-10-CM

## 2024-07-23 RX ORDER — OMEPRAZOLE 20 MG/1
CAPSULE, DELAYED RELEASE ORAL
Qty: 90 CAPSULE | Refills: 1 | Status: SHIPPED | OUTPATIENT
Start: 2024-07-23

## 2024-09-13 ENCOUNTER — APPOINTMENT (OUTPATIENT)
Dept: PRIMARY CARE | Facility: CLINIC | Age: 69
End: 2024-09-13
Payer: MEDICARE

## 2024-09-13 VITALS
DIASTOLIC BLOOD PRESSURE: 79 MMHG | SYSTOLIC BLOOD PRESSURE: 115 MMHG | HEART RATE: 78 BPM | WEIGHT: 169 LBS | BODY MASS INDEX: 26.94 KG/M2 | OXYGEN SATURATION: 95 %

## 2024-09-13 DIAGNOSIS — Z00.00 ROUTINE GENERAL MEDICAL EXAMINATION AT HEALTH CARE FACILITY: Primary | ICD-10-CM

## 2024-09-13 DIAGNOSIS — R21 RASH: ICD-10-CM

## 2024-09-13 DIAGNOSIS — R73.03 PREDIABETES: ICD-10-CM

## 2024-09-13 DIAGNOSIS — E78.5 HYPERLIPIDEMIA, UNSPECIFIED HYPERLIPIDEMIA TYPE: ICD-10-CM

## 2024-09-13 DIAGNOSIS — L91.8 SKIN TAG: ICD-10-CM

## 2024-09-13 PROCEDURE — G0439 PPPS, SUBSEQ VISIT: HCPCS | Performed by: INTERNAL MEDICINE

## 2024-09-13 PROCEDURE — 1036F TOBACCO NON-USER: CPT | Performed by: INTERNAL MEDICINE

## 2024-09-13 PROCEDURE — 1170F FXNL STATUS ASSESSED: CPT | Performed by: INTERNAL MEDICINE

## 2024-09-13 PROCEDURE — 1159F MED LIST DOCD IN RCRD: CPT | Performed by: INTERNAL MEDICINE

## 2024-09-13 PROCEDURE — 1160F RVW MEDS BY RX/DR IN RCRD: CPT | Performed by: INTERNAL MEDICINE

## 2024-09-13 PROCEDURE — 99214 OFFICE O/P EST MOD 30 MIN: CPT | Performed by: INTERNAL MEDICINE

## 2024-09-13 RX ORDER — TRIAMCINOLONE ACETONIDE 1 MG/G
CREAM TOPICAL 2 TIMES DAILY
Qty: 30 G | Refills: 0 | Status: SHIPPED | OUTPATIENT
Start: 2024-09-13 | End: 2024-09-14

## 2024-09-13 ASSESSMENT — ENCOUNTER SYMPTOMS
CHEST TIGHTNESS: 0
CHILLS: 0
DIARRHEA: 0
NECK STIFFNESS: 0
ENDOCRINE NEGATIVE: 1
FEVER: 0
CONSTIPATION: 0
DYSPHORIC MOOD: 0
COUGH: 0
SHORTNESS OF BREATH: 0
MYALGIAS: 0
AGITATION: 0
SLEEP DISTURBANCE: 0
EYES NEGATIVE: 1
FATIGUE: 0
CARDIOVASCULAR NEGATIVE: 1
NEUROLOGICAL NEGATIVE: 1
ARTHRALGIAS: 0
ABDOMINAL PAIN: 0
UNEXPECTED WEIGHT CHANGE: 0

## 2024-09-13 ASSESSMENT — PATIENT HEALTH QUESTIONNAIRE - PHQ9
2. FEELING DOWN, DEPRESSED OR HOPELESS: NOT AT ALL
SUM OF ALL RESPONSES TO PHQ9 QUESTIONS 1 AND 2: 0
1. LITTLE INTEREST OR PLEASURE IN DOING THINGS: NOT AT ALL

## 2024-09-13 ASSESSMENT — ACTIVITIES OF DAILY LIVING (ADL)
BATHING: INDEPENDENT
DOING_HOUSEWORK: INDEPENDENT
TAKING_MEDICATION: INDEPENDENT
DRESSING: INDEPENDENT
GROCERY_SHOPPING: INDEPENDENT
MANAGING_FINANCES: INDEPENDENT

## 2024-09-13 NOTE — PROGRESS NOTES
Subjective   Patient ID: Mariel Mckeon is a 69 y.o. female with a history of invasive ductal carcinoma (s/p lumpectomy with chemo in 2016) and sciatica who presents for 6 month follow up.    HPI   Skin tags:  ~10-20 on neck   -have been bothersome  -does not wish derm referral at this time    Rash on L forearm for ~3 years.  -denies pain, itching, changes in size    No other concerns at this time.    Review of Systems   Constitutional:  Negative for chills, fatigue, fever and unexpected weight change.   HENT: Negative.     Eyes: Negative.    Respiratory:  Negative for cough, chest tightness and shortness of breath.    Cardiovascular: Negative.    Gastrointestinal:  Negative for abdominal pain, constipation and diarrhea.   Endocrine: Negative.    Genitourinary: Negative.    Musculoskeletal:  Negative for arthralgias, myalgias and neck stiffness.   Neurological: Negative.    Psychiatric/Behavioral:  Negative for agitation, dysphoric mood, sleep disturbance and suicidal ideas.        Objective   /79   Pulse 78   Wt 76.7 kg (169 lb)   SpO2 95%   BMI 26.94 kg/m²     Physical Exam  Constitutional:       General: She is not in acute distress.     Appearance: Normal appearance.   HENT:      Head: Normocephalic.      Right Ear: Tympanic membrane and ear canal normal.      Left Ear: Tympanic membrane and ear canal normal.      Nose: Nose normal.      Mouth/Throat:      Mouth: Mucous membranes are moist.      Pharynx: Oropharynx is clear.   Eyes:      Extraocular Movements: Extraocular movements intact.      Pupils: Pupils are equal, round, and reactive to light.   Cardiovascular:      Rate and Rhythm: Normal rate and regular rhythm.      Pulses: Normal pulses.      Heart sounds: Normal heart sounds. No murmur heard.  Pulmonary:      Effort: Pulmonary effort is normal.      Breath sounds: Normal breath sounds.   Abdominal:      Palpations: Abdomen is soft.      Tenderness: There is no abdominal tenderness.    Musculoskeletal:         General: No swelling or tenderness. Normal range of motion.      Cervical back: Neck supple. No rigidity.      Right lower leg: No edema.      Left lower leg: No edema.   Skin:     General: Skin is warm and dry.      Capillary Refill: Capillary refill takes less than 2 seconds.   Neurological:      General: No focal deficit present.      Mental Status: She is alert.   Psychiatric:         Mood and Affect: Mood normal.         Behavior: Behavior normal.         Assessment/Plan   #Rash, L forearm  -demarcated, erythematous, nonvesicular, nontender, nonscaly, nonpruritic ~3cm lesion  -dermatitis vs psoriasis  -triamcinolone topical cream BID    #Skin tags  -discussed trial of OTC remedies     #Breast cancer   -Following with oncology  -Cont Letrozole  -Mamm wnl 12/29/23     #Prediabetes  -A1C 6.3 on 3/13/24- check A1c  -Discussed lifestyle changes  -Cont to monitor     #HLD  -Cont atorvastatin 10mg every other day. Check lipid panel      #GERD  -Cont omeprazole 20mg daily      #Osteopenia  -DEXA 8/2023 T score -1.6, repeat q2y  -Cont vit D and calcium supplements     Influenza: Never   COVID: x2  Prevnar 13: Never  Pneumovax 23: Never  Shingrix: Never  Mamm: 1/26/24  DEXA: 8/2/23 ostepenia  Pap: s/p hysterectomy   Colorectal ca: 3/14/24  Lung ca screening: Declines     Steve Diaz, MS4  [facility]     I saw and evaluated the patient. I personally obtained the key and critical portions of the history and physical exam or was physically present for key and critical portions performed by the resident/fellow. I reviewed the resident/fellow's documentation and discussed the patient with the resident/fellow. I agree with the resident/fellow's medical decision making as documented in the note.    Ayan Chowdhury,

## 2024-09-13 NOTE — PATIENT INSTRUCTIONS
Please complete fasting blood work. Fast for 10 hours, black coffee and water the morning of labs are OK.     Cream for rash     6 months

## 2024-09-13 NOTE — PROGRESS NOTES
Subjective   Reason for Visit: Mariel Mckeon is an 69 y.o. female here for a Medicare Wellness visit.          Reviewed all medications by prescribing practitioner or clinical pharmacist (such as prescriptions, OTCs, herbal therapies and supplements) and documented in the medical record.    HPI    Patient Care Team:  Ayan Chowdhury DO as PCP - General (Internal Medicine)  Ayan Chowdhury DO as PCP - [employer] PCP     Review of Systems    Objective   Vitals:  /79   Pulse 78   Wt 76.7 kg (169 lb)   SpO2 95%   BMI 26.94 kg/m²       Physical Exam    Assessment & Plan  Routine general medical examination at health care facility    Orders:  •  1 Year Follow Up In Primary Care - Wellness Exam; Future         {AWV Counseling (Optional):23304}

## 2024-09-20 ENCOUNTER — HOSPITAL ENCOUNTER (OUTPATIENT)
Dept: RADIOLOGY | Facility: HOSPITAL | Age: 69
Discharge: HOME | End: 2024-09-20
Payer: MEDICARE

## 2024-09-20 ENCOUNTER — LAB (OUTPATIENT)
Dept: LAB | Facility: LAB | Age: 69
End: 2024-09-20
Payer: MEDICARE

## 2024-09-20 VITALS — BODY MASS INDEX: 27.16 KG/M2 | HEIGHT: 66 IN | WEIGHT: 169 LBS

## 2024-09-20 DIAGNOSIS — E78.5 HYPERLIPIDEMIA, UNSPECIFIED HYPERLIPIDEMIA TYPE: ICD-10-CM

## 2024-09-20 DIAGNOSIS — C50.119 MALIGNANT NEOPLASM OF CENTRAL PORTION OF BREAST IN FEMALE, ESTROGEN RECEPTOR POSITIVE, UNSPECIFIED LATERALITY: ICD-10-CM

## 2024-09-20 DIAGNOSIS — Z17.0 MALIGNANT NEOPLASM OF CENTRAL PORTION OF BREAST IN FEMALE, ESTROGEN RECEPTOR POSITIVE, UNSPECIFIED LATERALITY: ICD-10-CM

## 2024-09-20 DIAGNOSIS — Z17.0 MALIGNANT NEOPLASM OF AREOLA OF LEFT BREAST IN FEMALE, ESTROGEN RECEPTOR POSITIVE: ICD-10-CM

## 2024-09-20 DIAGNOSIS — R73.03 PREDIABETES: ICD-10-CM

## 2024-09-20 DIAGNOSIS — C50.012 MALIGNANT NEOPLASM OF AREOLA OF LEFT BREAST IN FEMALE, ESTROGEN RECEPTOR POSITIVE: ICD-10-CM

## 2024-09-20 LAB
ALBUMIN SERPL BCP-MCNC: 4.5 G/DL (ref 3.4–5)
ALP SERPL-CCNC: 89 U/L (ref 33–136)
ALT SERPL W P-5'-P-CCNC: 25 U/L (ref 7–45)
ANION GAP SERPL CALC-SCNC: 15 MMOL/L (ref 10–20)
AST SERPL W P-5'-P-CCNC: 22 U/L (ref 9–39)
BASOPHILS # BLD AUTO: 0.06 X10*3/UL (ref 0–0.1)
BASOPHILS NFR BLD AUTO: 1.1 %
BILIRUB SERPL-MCNC: 0.6 MG/DL (ref 0–1.2)
BUN SERPL-MCNC: 10 MG/DL (ref 6–23)
CALCIUM SERPL-MCNC: 10.1 MG/DL (ref 8.6–10.3)
CANCER AG27-29 SERPL-ACNC: 41.9 U/ML (ref 0–38.6)
CHLORIDE SERPL-SCNC: 105 MMOL/L (ref 98–107)
CHOLEST SERPL-MCNC: 106 MG/DL (ref 0–199)
CHOLESTEROL/HDL RATIO: 4.4
CO2 SERPL-SCNC: 26 MMOL/L (ref 21–32)
CREAT SERPL-MCNC: 0.89 MG/DL (ref 0.5–1.05)
EGFRCR SERPLBLD CKD-EPI 2021: 71 ML/MIN/1.73M*2
EOSINOPHIL # BLD AUTO: 0.19 X10*3/UL (ref 0–0.7)
EOSINOPHIL NFR BLD AUTO: 3.4 %
ERYTHROCYTE [DISTWIDTH] IN BLOOD BY AUTOMATED COUNT: 12.5 % (ref 11.5–14.5)
EST. AVERAGE GLUCOSE BLD GHB EST-MCNC: 134 MG/DL
GLUCOSE SERPL-MCNC: 114 MG/DL (ref 74–99)
HBA1C MFR BLD: 6.3 %
HCT VFR BLD AUTO: 45.4 % (ref 36–46)
HDLC SERPL-MCNC: 24.2 MG/DL
HGB BLD-MCNC: 14.9 G/DL (ref 12–16)
IMM GRANULOCYTES # BLD AUTO: 0.02 X10*3/UL (ref 0–0.7)
IMM GRANULOCYTES NFR BLD AUTO: 0.4 % (ref 0–0.9)
LDLC SERPL CALC-MCNC: 14 MG/DL
LYMPHOCYTES # BLD AUTO: 1.5 X10*3/UL (ref 1.2–4.8)
LYMPHOCYTES NFR BLD AUTO: 26.8 %
MCH RBC QN AUTO: 31.6 PG (ref 26–34)
MCHC RBC AUTO-ENTMCNC: 32.8 G/DL (ref 32–36)
MCV RBC AUTO: 96 FL (ref 80–100)
MONOCYTES # BLD AUTO: 0.41 X10*3/UL (ref 0.1–1)
MONOCYTES NFR BLD AUTO: 7.3 %
NEUTROPHILS # BLD AUTO: 3.42 X10*3/UL (ref 1.2–7.7)
NEUTROPHILS NFR BLD AUTO: 61 %
NON HDL CHOLESTEROL: 82 MG/DL (ref 0–149)
NRBC BLD-RTO: 0 /100 WBCS (ref 0–0)
PLATELET # BLD AUTO: 191 X10*3/UL (ref 150–450)
POTASSIUM SERPL-SCNC: 4.6 MMOL/L (ref 3.5–5.3)
PROT SERPL-MCNC: 6.6 G/DL (ref 6.4–8.2)
RBC # BLD AUTO: 4.72 X10*6/UL (ref 4–5.2)
SODIUM SERPL-SCNC: 141 MMOL/L (ref 136–145)
TRIGL SERPL-MCNC: 340 MG/DL (ref 0–149)
VLDL: 68 MG/DL (ref 0–40)
WBC # BLD AUTO: 5.6 X10*3/UL (ref 4.4–11.3)

## 2024-09-20 PROCEDURE — 77067 SCR MAMMO BI INCL CAD: CPT

## 2024-09-20 PROCEDURE — 86300 IMMUNOASSAY TUMOR CA 15-3: CPT

## 2024-09-20 PROCEDURE — 36415 COLL VENOUS BLD VENIPUNCTURE: CPT

## 2024-09-20 PROCEDURE — 80061 LIPID PANEL: CPT

## 2024-09-20 PROCEDURE — 80053 COMPREHEN METABOLIC PANEL: CPT

## 2024-09-20 PROCEDURE — 77063 BREAST TOMOSYNTHESIS BI: CPT | Performed by: RADIOLOGY

## 2024-09-20 PROCEDURE — 85025 COMPLETE CBC W/AUTO DIFF WBC: CPT

## 2024-09-20 PROCEDURE — 83036 HEMOGLOBIN GLYCOSYLATED A1C: CPT

## 2024-09-20 PROCEDURE — 77067 SCR MAMMO BI INCL CAD: CPT | Performed by: RADIOLOGY

## 2024-09-25 ENCOUNTER — OFFICE VISIT (OUTPATIENT)
Dept: HEMATOLOGY/ONCOLOGY | Facility: CLINIC | Age: 69
End: 2024-09-25
Payer: MEDICARE

## 2024-09-25 VITALS
DIASTOLIC BLOOD PRESSURE: 83 MMHG | HEART RATE: 79 BPM | SYSTOLIC BLOOD PRESSURE: 144 MMHG | TEMPERATURE: 96.6 F | RESPIRATION RATE: 16 BRPM | OXYGEN SATURATION: 95 % | BODY MASS INDEX: 27.79 KG/M2 | WEIGHT: 172.18 LBS

## 2024-09-25 DIAGNOSIS — C50.012 BILATERAL MALIGNANT NEOPLASM OF AREOLA OF BREAST IN FEMALE, UNSPECIFIED ESTROGEN RECEPTOR STATUS: ICD-10-CM

## 2024-09-25 DIAGNOSIS — Z17.0 MALIGNANT NEOPLASM OF AREOLA OF LEFT BREAST IN FEMALE, ESTROGEN RECEPTOR POSITIVE: ICD-10-CM

## 2024-09-25 DIAGNOSIS — Z17.0 MALIGNANT NEOPLASM OF CENTRAL PORTION OF BREAST IN FEMALE, ESTROGEN RECEPTOR POSITIVE, UNSPECIFIED LATERALITY: Primary | ICD-10-CM

## 2024-09-25 DIAGNOSIS — C50.012 MALIGNANT NEOPLASM OF AREOLA OF LEFT BREAST IN FEMALE, ESTROGEN RECEPTOR POSITIVE: ICD-10-CM

## 2024-09-25 DIAGNOSIS — C50.011 BILATERAL MALIGNANT NEOPLASM OF AREOLA OF BREAST IN FEMALE, UNSPECIFIED ESTROGEN RECEPTOR STATUS: ICD-10-CM

## 2024-09-25 DIAGNOSIS — C50.119 MALIGNANT NEOPLASM OF CENTRAL PORTION OF BREAST IN FEMALE, ESTROGEN RECEPTOR POSITIVE, UNSPECIFIED LATERALITY: Primary | ICD-10-CM

## 2024-09-25 PROCEDURE — 99214 OFFICE O/P EST MOD 30 MIN: CPT | Performed by: NURSE PRACTITIONER

## 2024-09-25 PROCEDURE — 1126F AMNT PAIN NOTED NONE PRSNT: CPT | Performed by: NURSE PRACTITIONER

## 2024-09-25 PROCEDURE — 1036F TOBACCO NON-USER: CPT | Performed by: NURSE PRACTITIONER

## 2024-09-25 PROCEDURE — 1159F MED LIST DOCD IN RCRD: CPT | Performed by: NURSE PRACTITIONER

## 2024-09-25 RX ORDER — LETROZOLE 2.5 MG/1
2.5 TABLET, FILM COATED ORAL DAILY
Qty: 90 TABLET | Refills: 3 | Status: SHIPPED | OUTPATIENT
Start: 2024-09-25 | End: 2025-09-25

## 2024-09-25 ASSESSMENT — PAIN SCALES - GENERAL: PAINLEVEL: 0-NO PAIN

## 2024-09-25 NOTE — PROGRESS NOTES
Patient ID: Mariel Mckeon is a 69 y.o. female.    Subjective    HPI  Patient is a pleasant 68-year-old  female with history of left invasive ductal carcinoma diagnosed in September 2016.     Patient  moved from [place] where she was followed and treated by Danya Arthur MD.  Her last visit with oncologist in [place] was June 6, 2022.  She was diagnosed with left breast upper inner quadrant invasive ductal carcinoma grade 2 per biopsy in  September 6, 2016 ER 95%, AK -0% HER2 1+ by IHC but positive by FISH with an average HER2 copy numbers of 6.5 and a ratio of 1.6 Ki-67 was 80 to 90%.  She underwent left lower outer quadrant 6 o'clock position biopsy on October 11, 2016 which showed intraductal  papilloma and atypical ductal hyperplasia and another focus at left lower inner quadrant 7:00 biopsy showed intraductal papilloma.  August 2022 she moved  to [place].     Patient underwent left lumpectomy with a left axillary sentinel lymph node biopsy on November 8, 2016.  Final pathology showed grade 3 invasive ductal carcinoma with micropapillary features tumor measured 3 cm with margins positive for invasive ductal  carcinoma extended into the superior margin.  There was lymphovascular invasion present.  There was high-grade DCIS solid type with comedonecrosis measuring 2.5 cm with a negative margins.      Patient then underwent left breast wider excision on December 8, 2016 as well as left axillary lymph node dissection patient was found to have 5 negative lymph nodes.  Pathology showed breast tissue with biopsy site changes and no residual invasive or  in situ carcinoma.     Patient underwent adjuvant chemotherapy with Taxotere 75mg/m2, Carboplatin AUC 5, Herceptin and Perjita between January 6, 2017 and April 21, 2017.  Beginning cycle 5 of TCHP on March 31, 2017 the dose of Taxotere was reduced to 60mg/m2 and carbo AUC  of 4 due to febrile neutropenia and thrombocytopenia.     Patient completed adjuvant  radiation therapy from 8/15/2017 through July 7, 2017     Patient completed total of 12 cycles of Herceptin between May 12, 2017 and December 29, 2017     Patient was started on letrozole 2.5 mg daily starting August 7, 2017 and discontinued April 25, 2019 due to arthralgia and hair thinning.  Patient was started on anastrozole darting from April 26, 2019 and discontinued October 24, 2019 due to arthralgia.   Patient was then started on exemestane starting October 25, 2019.  She has been getting Exemestane on free drug program and that will be ending, so she questions if she still needs to be on antihormonal therapy.  Due to  insurance purposes she will be starting back on letrozole which she has tolerated well.  She confirms she is taking this.   She had a mammogram 9/13/23 showed no malignancy.   She had sciatica and will be undergoing physical therapy.  She is otherwise doing well denies fever, chills, night sweats, decreased appetite, weight loss, chest pain, shortness of breath, nausea, vomiting, diarrhea, constipation, abdominal  pain, or urinary symptoms.  Denies any lumps or bumps.  She previously stated  she has some mild neuropathy on the bottom of her feet and in her big toes, but not a complaint today's visit.       Patient has a history of osteoporosis has been on Fosamax and self discontinued and declined Prolia in the past last DEXA scan on 8/3/23 reviewed osteopenia.  Patient has been taking vitamin D and calcium.     Denies any complaints at this time.  No headaches, visual changes, shortness of breath or bone pain    Objective    BSA: 1.91 meters squared  /83 (BP Location: Right arm, Patient Position: Sitting, BP Cuff Size: Adult)   Pulse 79   Temp 35.9 °C (96.6 °F) (Temporal)   Resp 16   Wt 78.1 kg (172 lb 2.9 oz)   SpO2 95%   BMI 27.79 kg/m²      Physical Exam  Constitutional:       Appearance: Normal appearance.   Eyes:      Conjunctiva/sclera: Conjunctivae normal.      Pupils:  Pupils are equal, round, and reactive to light.   Cardiovascular:      Rate and Rhythm: Normal rate and regular rhythm.      Pulses: Normal pulses.      Heart sounds: Normal heart sounds. No murmur heard.  Pulmonary:      Effort: Pulmonary effort is normal. No respiratory distress.      Breath sounds: Normal breath sounds. No wheezing.   Abdominal:      General: There is no distension.      Palpations: Abdomen is soft. There is no mass.      Tenderness: There is no abdominal tenderness.   Musculoskeletal:         General: Normal range of motion.   Lymphadenopathy:      Cervical: No cervical adenopathy.   Skin:     Findings: Lesion present.      Comments: Pink raised spot <1cm on right forearm   Neurological:      Motor: No weakness.   Psychiatric:      Comments: Left breast continues with nipple inversion since time of surgery.  No areas of concern     Performance Status:  Asymptomatic      Assessment/Plan   Left invasive ductal carcinoma s/p lumpectomy 11/8/16 of 3cm tumor with positive margins requiring reexcision with 0/5LN.  She was treated with Taxotere, Carboplatin,  Herceptin and Perjita X 6 completed 4/21/17 and finished the year of Herceptin.  She completed radiation therapy 7/7/2017.  She was started on antihormonal therapy 8/7/17 and switched to anastrazole until 10/24/19, both intolerance arthralgia.  She has  been on exemestane since 10/25/19.  She will be set switching to letrozole due to insurance request.  She will no longer be getting free exemestane. I would respect Dr Arthur suggestion to continue a full 10 years of antihormonal therapy as discussed  with patient prior to her moving to [place].         Mammogram completed 1/26/24 shows no malignancy  Osteopenia declined Prolia,  DEXA due Aug 2025  Plan:    Follow-up: 6 months  DEXA Aug 2025  Plan to continue full 10 years of AI, letrozole  History of lymphedema not noted today, continue with sleeve as needed.   Not prominent today                 Diagnoses and all orders for this visit:  Malignant neoplasm of central portion of breast in female, estrogen receptor positive, unspecified laterality (Multi)  -     CBC and Auto Differential; Future  -     Comprehensive Metabolic Panel; Future  -     Cancer Antigen 27-29; Future  -     CBC and Auto Differential; Future  -     Comprehensive Metabolic Panel; Future  -     Cancer Antigen 27-29; Future  -     Clinic Appointment Request Follow up; Future  Malignant neoplasm of areola of left breast in female, estrogen receptor positive (Multi)  -     Clinic Appointment Request Follow up           Priscilla Gomez PA-C

## 2024-10-02 ENCOUNTER — TELEPHONE (OUTPATIENT)
Dept: HEMATOLOGY/ONCOLOGY | Facility: CLINIC | Age: 69
End: 2024-10-02
Payer: MEDICARE

## 2024-10-14 NOTE — TELEPHONE ENCOUNTER
Called and spoke to billing department. Called and left VM for Mariel to explain next steps. Encouraged pt to call our office back to further discuss and left our contact information on voicemail.

## 2024-10-15 NOTE — TELEPHONE ENCOUNTER
Was unable to reach patient, left VM with our office contact information and encouraged pt to call our office back to further discuss.

## 2024-10-16 NOTE — TELEPHONE ENCOUNTER
Called and spoke to Mariel, let her know that I had reached out to the billing department and they recommended the ordering provider call the insurance company to discuss the need for the mammogram. Pt was notified that Priscilla Jason has been given the contact information for the insurance and we will give her a call back once she speaks with them. Patient agreed to plan and verbalized understanding using teach back method.      No, not prescribed...

## 2024-10-22 ENCOUNTER — TELEPHONE (OUTPATIENT)
Dept: HEMATOLOGY/ONCOLOGY | Facility: CLINIC | Age: 69
End: 2024-10-22
Payer: MEDICARE

## 2024-10-22 NOTE — TELEPHONE ENCOUNTER
PT updated per  message below. No further questions noted att. Pt will follow up with insurance company.

## 2024-10-23 ENCOUNTER — OFFICE VISIT (OUTPATIENT)
Dept: PRIMARY CARE | Facility: CLINIC | Age: 69
End: 2024-10-23
Payer: MEDICARE

## 2024-10-23 ENCOUNTER — TELEPHONE (OUTPATIENT)
Dept: HEMATOLOGY/ONCOLOGY | Facility: CLINIC | Age: 69
End: 2024-10-23

## 2024-10-23 VITALS
HEART RATE: 65 BPM | WEIGHT: 170 LBS | SYSTOLIC BLOOD PRESSURE: 142 MMHG | BODY MASS INDEX: 27.44 KG/M2 | OXYGEN SATURATION: 96 % | DIASTOLIC BLOOD PRESSURE: 83 MMHG

## 2024-10-23 DIAGNOSIS — N39.0 URINARY TRACT INFECTION WITHOUT HEMATURIA, SITE UNSPECIFIED: Primary | ICD-10-CM

## 2024-10-23 DIAGNOSIS — R21 RASH: ICD-10-CM

## 2024-10-23 LAB
POC APPEARANCE, URINE: ABNORMAL
POC BILIRUBIN, URINE: NEGATIVE
POC BLOOD, URINE: ABNORMAL
POC COLOR, URINE: YELLOW
POC GLUCOSE, URINE: NEGATIVE MG/DL
POC KETONES, URINE: NEGATIVE MG/DL
POC LEUKOCYTES, URINE: ABNORMAL
POC NITRITE,URINE: NEGATIVE
POC PH, URINE: 5.5 PH
POC PROTEIN, URINE: NEGATIVE MG/DL
POC SPECIFIC GRAVITY, URINE: 1.02
POC UROBILINOGEN, URINE: 0.2 EU/DL

## 2024-10-23 PROCEDURE — 1159F MED LIST DOCD IN RCRD: CPT | Performed by: INTERNAL MEDICINE

## 2024-10-23 PROCEDURE — 81003 URINALYSIS AUTO W/O SCOPE: CPT | Performed by: INTERNAL MEDICINE

## 2024-10-23 PROCEDURE — 1158F ADVNC CARE PLAN TLK DOCD: CPT | Performed by: INTERNAL MEDICINE

## 2024-10-23 PROCEDURE — 87086 URINE CULTURE/COLONY COUNT: CPT

## 2024-10-23 PROCEDURE — 87186 SC STD MICRODIL/AGAR DIL: CPT

## 2024-10-23 PROCEDURE — 87077 CULTURE AEROBIC IDENTIFY: CPT

## 2024-10-23 PROCEDURE — 1123F ACP DISCUSS/DSCN MKR DOCD: CPT | Performed by: INTERNAL MEDICINE

## 2024-10-23 PROCEDURE — 99214 OFFICE O/P EST MOD 30 MIN: CPT | Performed by: INTERNAL MEDICINE

## 2024-10-23 PROCEDURE — 1036F TOBACCO NON-USER: CPT | Performed by: INTERNAL MEDICINE

## 2024-10-23 PROCEDURE — 81001 URINALYSIS AUTO W/SCOPE: CPT

## 2024-10-23 PROCEDURE — 1160F RVW MEDS BY RX/DR IN RCRD: CPT | Performed by: INTERNAL MEDICINE

## 2024-10-23 RX ORDER — KETOCONAZOLE 20 MG/G
CREAM TOPICAL 2 TIMES DAILY
Qty: 30 G | Refills: 0 | Status: SHIPPED | OUTPATIENT
Start: 2024-10-23 | End: 2025-10-23

## 2024-10-23 RX ORDER — NITROFURANTOIN 25; 75 MG/1; MG/1
100 CAPSULE ORAL 2 TIMES DAILY
Qty: 10 CAPSULE | Refills: 0 | Status: SHIPPED | OUTPATIENT
Start: 2024-10-23 | End: 2024-10-28

## 2024-10-23 NOTE — PROGRESS NOTES
Subjective   Patient ID: Mariel Mckeon is a 69 y.o. female with an Hx that includes breast cancer, HLD, osteopenia, prediabetes, and GERD who presents for concern for UTI.     She says that she has been experiencing urinary frequency x3 weeks. She denies burning, change in urine color, back/flank pain, or fever.   She says that she tried OTC cranberry supplements, which helped, but that symptoms worsen she stops taking them. She says this feels similar to her prior UTI's.    She's also had a small rash on her L forearm, which she says isn't itchy, painful, or bothersome--but it hasn't improved since she was seen in the office 1 month ago and started on triamcinoclone.     Objective   /83   Pulse 65   Wt 77.1 kg (170 lb)   SpO2 96%   BMI 27.44 kg/m²     Physical Exam  Constitutional:       Appearance: Normal appearance.   Cardiovascular:      Rate and Rhythm: Normal rate and regular rhythm.      Heart sounds: Normal heart sounds.   Pulmonary:      Effort: No respiratory distress.      Breath sounds: Normal breath sounds. No wheezing or rales.   Abdominal:      General: Abdomen is flat. There is no distension.      Palpations: Abdomen is soft.      Tenderness: There is no abdominal tenderness.   Genitourinary:     Comments: No CVA tenderness  Neurological:      Mental Status: She is alert.     Assessment/Plan   #Uncomplicated UTI  - Urinary frequency x3 weeks  - Urinalysis w/ reflex- leukocyte esterase, blood--send for micro and Cx   - Start macrobid, 100 mg BID x5 days    #Rash, L forearm  -Demarcated, erythematous, scaly, nonvesicular, nontender, nonpruritic 3 cm lesion  -Dermatitis vs psoriasis vs tinea   -No improvement w/ triamcinolone  -Start topical ketoconazole 2% BID  -Referral to dermatology if ketoconazole not effective after 10-14 days--contact info provided     #Skin tags  -discussed trial of OTC remedies        Influenza: Never   COVID: x2  Prevnar 13: Never  Pneumovax 23: Never  Shingrix:  Never  Mamm: 1/26/24  DEXA: 8/2/23 ostepenia  Pap: s/p hysterectomy   Colorectal ca: 3/14/24  Lung ca screening: Declines

## 2024-10-24 LAB
APPEARANCE UR: ABNORMAL
BACTERIA #/AREA URNS AUTO: ABNORMAL /HPF
BILIRUB UR STRIP.AUTO-MCNC: NEGATIVE MG/DL
COLOR UR: YELLOW
GLUCOSE UR STRIP.AUTO-MCNC: NORMAL MG/DL
KETONES UR STRIP.AUTO-MCNC: NEGATIVE MG/DL
LEUKOCYTE ESTERASE UR QL STRIP.AUTO: ABNORMAL
MUCOUS THREADS #/AREA URNS AUTO: ABNORMAL /LPF
NITRITE UR QL STRIP.AUTO: NEGATIVE
PH UR STRIP.AUTO: 5.5 [PH]
PROT UR STRIP.AUTO-MCNC: NEGATIVE MG/DL
RBC # UR STRIP.AUTO: ABNORMAL /UL
RBC #/AREA URNS AUTO: ABNORMAL /HPF
SP GR UR STRIP.AUTO: 1.02
SQUAMOUS #/AREA URNS AUTO: ABNORMAL /HPF
UROBILINOGEN UR STRIP.AUTO-MCNC: NORMAL MG/DL
WBC #/AREA URNS AUTO: >50 /HPF

## 2024-10-25 ENCOUNTER — TELEPHONE (OUTPATIENT)
Dept: HEMATOLOGY/ONCOLOGY | Facility: CLINIC | Age: 69
End: 2024-10-25

## 2024-10-26 LAB
BACTERIA UR CULT: ABNORMAL
BACTERIA UR CULT: ABNORMAL

## 2024-10-28 NOTE — TELEPHONE ENCOUNTER
Called and spoke to patient, she stated that she hadn't heard anything from insurance. I let her know that Priscilla did submit everything and pt was under the impression that it could take up to 90 days for a determination. I let pt know that I would reach out to billing department to see if they had heard anything.

## 2024-11-04 ENCOUNTER — TELEPHONE (OUTPATIENT)
Dept: HEMATOLOGY/ONCOLOGY | Facility: CLINIC | Age: 69
End: 2024-11-04
Payer: MEDICARE

## 2024-11-05 NOTE — TELEPHONE ENCOUNTER
Reminded pt that we did submit additional information to insurance and they said it could take up to 90 days for a determination. Patient agreed to let us know if she hears anything and we will call her if we hear anything. Patient agreed to plan and verbalized understanding using teach back method.

## 2024-11-14 ENCOUNTER — SOCIAL WORK (OUTPATIENT)
Dept: CASE MANAGEMENT | Facility: HOSPITAL | Age: 69
End: 2024-11-14
Payer: MEDICARE

## 2024-11-14 NOTE — PROGRESS NOTES
Attempted to call pt regarding her mammogram bill that insurance is refusing to cover to screen her for Medicaid and refer her to HCAP. No answer, left generic voicemail asking for return call. SW will remain available.   UPDATE 1100: Pt returned writers call. Discussed how insurance has upheld their denial and are asking pt to pay the remaining bill. Pt expressed frustration with the errors that led to this bill; writer attempted service recovery. Screened pt for Medicaid and she does meet income limits. Encouraged pt to apply online for Medicaid to get additional coverage to prevent this sort of thing from happening again. Pt said she would go to her local library or call the [place] Medicaid Consumer Hotline at [phone] for help applying. Also informed pt about  HCAP program and how pt may be able to get her bill reduced or waived if she qualifies. Provided pt with phone number for  financial counseling office and encouraged her to reach out to them to apply. Pt denied any other  needs at this time. Encouraged pt to reach out should additional questions or concerns arise.   Teodora Arriaza, MSW, VLADIMIR

## 2024-11-29 ENCOUNTER — TELEPHONE (OUTPATIENT)
Dept: HEMATOLOGY/ONCOLOGY | Facility: CLINIC | Age: 69
End: 2024-11-29
Payer: MEDICARE

## 2024-12-03 NOTE — TELEPHONE ENCOUNTER
Called and spoke to patient, she said that she spoke with someone from billing or financial counseling and they were able to offer her 20% discount from the bill but patient wanted to look into other options. I let her know that I would reach out to financial navigators to see if there is anything more we can offer. Patient agreed to plan and verbalized understanding using teach back method.

## 2025-01-06 ENCOUNTER — TELEPHONE (OUTPATIENT)
Dept: HEMATOLOGY/ONCOLOGY | Facility: CLINIC | Age: 70
End: 2025-01-06
Payer: MEDICARE

## 2025-01-14 NOTE — TELEPHONE ENCOUNTER
Referred pt to financial counseling to discuss recent bill. Also updated her preferred pharmacy. Patient agreed to plan and verbalized understanding using teach back method.

## 2025-01-23 ENCOUNTER — SOCIAL WORK (OUTPATIENT)
Dept: CASE MANAGEMENT | Facility: HOSPITAL | Age: 70
End: 2025-01-23
Payer: MEDICARE

## 2025-01-23 NOTE — PROGRESS NOTES
"Pt came into clinic today as a visitor to her sisters appointment. While here, pt asked to speak with SW regarding the outstanding mammogram bill she is disputing. Pt said she needed a \"finance application\" for her  who is helping her appeal the bill. Informed pt that writer was unaware of a \"finance statement\" and the only  billing help program I am aware of is HCAP. Discussed HCAP with pt and she was interested in applying. Provided pt with HCAP application and information on  financial assistance program. Also discussed case with PATRICE Isaac who is sending financial counseling an email asking them to follow up with pt to help her apply for HCAP. Pt denied any other  needs at this time. Encouraged pt to reach out should additional questions or concerns arise.   PJ Larose, VLADIMIR    "

## 2025-01-29 NOTE — TELEPHONE ENCOUNTER
Spoke to patient in person when she came to visit with her sister, SW met with patient and she provided Mariel with an application for HCAP, I reached out to FC and asked that they contact the patient to ensure that she doesn't have any further questions. Patient agreed to plan and verbalized understanding using teach back method. Priscilla Gomez updated on this as well.

## 2025-02-19 DIAGNOSIS — E78.5 HYPERLIPIDEMIA, UNSPECIFIED HYPERLIPIDEMIA TYPE: ICD-10-CM

## 2025-02-20 RX ORDER — ATORVASTATIN CALCIUM 10 MG/1
TABLET, FILM COATED ORAL
Qty: 45 TABLET | Refills: 1 | Status: SHIPPED | OUTPATIENT
Start: 2025-02-20

## 2025-03-14 ENCOUNTER — APPOINTMENT (OUTPATIENT)
Dept: PRIMARY CARE | Facility: CLINIC | Age: 70
End: 2025-03-14
Payer: MEDICARE

## 2025-03-14 VITALS
DIASTOLIC BLOOD PRESSURE: 80 MMHG | WEIGHT: 174 LBS | OXYGEN SATURATION: 98 % | HEART RATE: 87 BPM | BODY MASS INDEX: 28.08 KG/M2 | SYSTOLIC BLOOD PRESSURE: 125 MMHG

## 2025-03-14 DIAGNOSIS — R73.03 PREDIABETES: Primary | ICD-10-CM

## 2025-03-14 DIAGNOSIS — M54.9 UPPER BACK PAIN: ICD-10-CM

## 2025-03-14 DIAGNOSIS — Z12.11 COLON CANCER SCREENING: ICD-10-CM

## 2025-03-14 DIAGNOSIS — C50.919 MALIGNANT NEOPLASM OF FEMALE BREAST, UNSPECIFIED ESTROGEN RECEPTOR STATUS, UNSPECIFIED LATERALITY, UNSPECIFIED SITE OF BREAST: ICD-10-CM

## 2025-03-14 DIAGNOSIS — E78.5 HYPERLIPIDEMIA, UNSPECIFIED HYPERLIPIDEMIA TYPE: ICD-10-CM

## 2025-03-14 PROCEDURE — 1126F AMNT PAIN NOTED NONE PRSNT: CPT | Performed by: INTERNAL MEDICINE

## 2025-03-14 PROCEDURE — G2211 COMPLEX E/M VISIT ADD ON: HCPCS | Performed by: INTERNAL MEDICINE

## 2025-03-14 PROCEDURE — 1124F ACP DISCUSS-NO DSCNMKR DOCD: CPT | Performed by: INTERNAL MEDICINE

## 2025-03-14 PROCEDURE — 99214 OFFICE O/P EST MOD 30 MIN: CPT | Performed by: INTERNAL MEDICINE

## 2025-03-14 PROCEDURE — 1159F MED LIST DOCD IN RCRD: CPT | Performed by: INTERNAL MEDICINE

## 2025-03-14 PROCEDURE — 1036F TOBACCO NON-USER: CPT | Performed by: INTERNAL MEDICINE

## 2025-03-14 RX ORDER — ECONAZOLE NITRATE 10 MG/G
CREAM TOPICAL
COMMUNITY
Start: 2025-03-12

## 2025-03-14 ASSESSMENT — PAIN SCALES - GENERAL: PAINLEVEL_OUTOF10: 0-NO PAIN

## 2025-03-14 NOTE — PROGRESS NOTES
Subjective   Patient ID: Mariel Mckeon is a 69 y.o. female with a Hx that includes breast cancer, HLD, osteopenia, prediabetes, and GERD.    right sided upper back pain that is exacerbated when she sits for extended amounts of time.     She's also had a small rash on her L forearm, which she says isn't itchy, painful, or bothersome--but it hasn't improved since she was seen in the office 1 month ago.     BP (169/97) elevated at the time of this visit. Pt states she checks BP regularly and this is an anomalous value. Prior visits recording BP of 120-140s.    Denies CP, SOB, weight loss, fever, chills.    Objective   BP (!) 169/97   Pulse 87   Wt 78.9 kg (174 lb)   SpO2 98%   BMI 28.08 kg/m²     Physical Exam  Constitutional:       Appearance: Normal appearance.   Cardiovascular:      Rate and Rhythm: Normal rate and regular rhythm.      Heart sounds: Normal heart sounds.   Pulmonary:      Effort: No respiratory distress.      Breath sounds: Normal breath sounds. No wheezing or rales.   Abdominal:      General: Abdomen is flat. There is no distension.      Palpations: Abdomen is soft.      Tenderness: There is no abdominal tenderness.   Genitourinary:     Comments: No CVA tenderness  Neurological:      Mental Status: She is alert.       Assessment/Plan     #Rash, L forearm  - Demarcated, erythematous, scaly, nonvesicular, nontender, nonpruritic 3 cm lesion  - Dermatitis vs psoriasis vs tinea   -No improvement w/ triamcinolone  - Cont. topical antifungal  - Follows w/ dermatology, being treated as tinea    #R upper back pain  - exacerbated by sitting provided info on home video based PT     #insomnia  -Recommended OTC melatonin     #prediabetes  #HLD  - A1c 9/20/24: 6.3  - repeat A1c and lipid panel ordered  -Cont atorvastatin     #Breast cancer   -Following with oncology  -Cont Letrozole  -Mamm UTD       Influenza: Never   COVID: x2  Prevnar 13: Never  Pneumovax 23: Never  Shingrix: Never  Mamm: 1/26/24  DEXA:  8/2/23 osteopenia--per gyn   Pap: s/p hysterectomy   Colorectal ca: 3/14/24 FIT  Lung ca screening: Declines

## 2025-03-14 NOTE — PATIENT INSTRUCTIONS
Try melatonin 1mg , can up to 10mg nightly as needed for sleep     Ask Dr. Mitchell upper back pain--youtube     Please complete fasting blood work. Fast for 10 hours, black coffee and water the morning of labs are OK.     6 months

## 2025-03-26 ENCOUNTER — OFFICE VISIT (OUTPATIENT)
Dept: HEMATOLOGY/ONCOLOGY | Facility: CLINIC | Age: 70
End: 2025-03-26
Payer: MEDICARE

## 2025-03-26 VITALS
TEMPERATURE: 97.5 F | WEIGHT: 174.16 LBS | SYSTOLIC BLOOD PRESSURE: 160 MMHG | HEART RATE: 77 BPM | BODY MASS INDEX: 28.11 KG/M2 | DIASTOLIC BLOOD PRESSURE: 84 MMHG | RESPIRATION RATE: 16 BRPM | OXYGEN SATURATION: 96 %

## 2025-03-26 DIAGNOSIS — C50.119 MALIGNANT NEOPLASM OF CENTRAL PORTION OF BREAST IN FEMALE, ESTROGEN RECEPTOR POSITIVE, UNSPECIFIED LATERALITY: ICD-10-CM

## 2025-03-26 DIAGNOSIS — C50.012 BILATERAL MALIGNANT NEOPLASM OF AREOLA OF BREAST IN FEMALE, UNSPECIFIED ESTROGEN RECEPTOR STATUS: ICD-10-CM

## 2025-03-26 DIAGNOSIS — M85.88 OSTEOPENIA OF OTHER SITE: ICD-10-CM

## 2025-03-26 DIAGNOSIS — R92.30 BREAST DENSITY: Primary | ICD-10-CM

## 2025-03-26 DIAGNOSIS — M85.80 OSTEOPENIA, UNSPECIFIED LOCATION: ICD-10-CM

## 2025-03-26 DIAGNOSIS — Z17.0 MALIGNANT NEOPLASM OF CENTRAL PORTION OF BREAST IN FEMALE, ESTROGEN RECEPTOR POSITIVE, UNSPECIFIED LATERALITY: ICD-10-CM

## 2025-03-26 DIAGNOSIS — C50.011 BILATERAL MALIGNANT NEOPLASM OF AREOLA OF BREAST IN FEMALE, UNSPECIFIED ESTROGEN RECEPTOR STATUS: ICD-10-CM

## 2025-03-26 LAB
ALBUMIN SERPL BCP-MCNC: 4.3 G/DL (ref 3.4–5)
ALP SERPL-CCNC: 98 U/L (ref 33–136)
ALT SERPL W P-5'-P-CCNC: 32 U/L (ref 7–45)
ANION GAP SERPL CALC-SCNC: 13 MMOL/L (ref 10–20)
AST SERPL W P-5'-P-CCNC: 24 U/L (ref 9–39)
BASOPHILS # BLD AUTO: 0.04 X10*3/UL (ref 0–0.1)
BASOPHILS NFR BLD AUTO: 0.7 %
BILIRUB SERPL-MCNC: 0.6 MG/DL (ref 0–1.2)
BUN SERPL-MCNC: 12 MG/DL (ref 6–23)
CALCIUM SERPL-MCNC: 9.3 MG/DL (ref 8.6–10.3)
CANCER AG27-29 SERPL-ACNC: 39.7 U/ML (ref 0–38.6)
CHLORIDE SERPL-SCNC: 105 MMOL/L (ref 98–107)
CHOLEST SERPL-MCNC: 101 MG/DL (ref 0–199)
CHOLESTEROL/HDL RATIO: 4.3
CO2 SERPL-SCNC: 26 MMOL/L (ref 21–32)
CREAT SERPL-MCNC: 0.89 MG/DL (ref 0.5–1.05)
EGFRCR SERPLBLD CKD-EPI 2021: 71 ML/MIN/1.73M*2
EOSINOPHIL # BLD AUTO: 0.22 X10*3/UL (ref 0–0.7)
EOSINOPHIL NFR BLD AUTO: 3.6 %
ERYTHROCYTE [DISTWIDTH] IN BLOOD BY AUTOMATED COUNT: 12.3 % (ref 11.5–14.5)
EST. AVERAGE GLUCOSE BLD GHB EST-MCNC: 146 MG/DL
GLUCOSE SERPL-MCNC: 195 MG/DL (ref 74–99)
HBA1C MFR BLD: 6.7 %
HCT VFR BLD AUTO: 43.9 % (ref 36–46)
HDLC SERPL-MCNC: 23.7 MG/DL
HGB BLD-MCNC: 14.7 G/DL (ref 12–16)
IMM GRANULOCYTES # BLD AUTO: 0.01 X10*3/UL (ref 0–0.7)
IMM GRANULOCYTES NFR BLD AUTO: 0.2 % (ref 0–0.9)
LYMPHOCYTES # BLD AUTO: 1.55 X10*3/UL (ref 1.2–4.8)
LYMPHOCYTES NFR BLD AUTO: 25.5 %
MCH RBC QN AUTO: 31.3 PG (ref 26–34)
MCHC RBC AUTO-ENTMCNC: 33.5 G/DL (ref 32–36)
MCV RBC AUTO: 94 FL (ref 80–100)
MONOCYTES # BLD AUTO: 0.33 X10*3/UL (ref 0.1–1)
MONOCYTES NFR BLD AUTO: 5.4 %
NEUTROPHILS # BLD AUTO: 3.93 X10*3/UL (ref 1.2–7.7)
NEUTROPHILS NFR BLD AUTO: 64.6 %
NON HDL CHOLESTEROL: 77 MG/DL (ref 0–149)
PLATELET # BLD AUTO: 197 X10*3/UL (ref 150–450)
POTASSIUM SERPL-SCNC: 3.9 MMOL/L (ref 3.5–5.3)
PROT SERPL-MCNC: 6.5 G/DL (ref 6.4–8.2)
RBC # BLD AUTO: 4.69 X10*6/UL (ref 4–5.2)
SODIUM SERPL-SCNC: 140 MMOL/L (ref 136–145)
TRIGL SERPL-MCNC: 397 MG/DL (ref 0–149)
VLDL: 79 MG/DL (ref 0–40)
WBC # BLD AUTO: 6.1 X10*3/UL (ref 4.4–11.3)

## 2025-03-26 PROCEDURE — 1126F AMNT PAIN NOTED NONE PRSNT: CPT

## 2025-03-26 PROCEDURE — 1159F MED LIST DOCD IN RCRD: CPT

## 2025-03-26 PROCEDURE — 1123F ACP DISCUSS/DSCN MKR DOCD: CPT

## 2025-03-26 PROCEDURE — 85025 COMPLETE CBC W/AUTO DIFF WBC: CPT

## 2025-03-26 PROCEDURE — 36415 COLL VENOUS BLD VENIPUNCTURE: CPT

## 2025-03-26 PROCEDURE — 1160F RVW MEDS BY RX/DR IN RCRD: CPT

## 2025-03-26 PROCEDURE — 86300 IMMUNOASSAY TUMOR CA 15-3: CPT | Mod: GEALAB

## 2025-03-26 PROCEDURE — 80053 COMPREHEN METABOLIC PANEL: CPT

## 2025-03-26 PROCEDURE — 99215 OFFICE O/P EST HI 40 MIN: CPT

## 2025-03-26 RX ORDER — LETROZOLE 2.5 MG/1
2.5 TABLET, FILM COATED ORAL DAILY
Qty: 90 TABLET | Refills: 1 | Status: SHIPPED | OUTPATIENT
Start: 2025-04-02

## 2025-03-26 ASSESSMENT — PAIN SCALES - GENERAL: PAINLEVEL_OUTOF10: 0-NO PAIN

## 2025-03-26 NOTE — PROGRESS NOTES
[facility]    PATIENT VISIT INFORMATION    Visit Type: New Visit    Referring Provider: Priscilla Gomez PA-C  Reason for referral: [facility] Surveillance   Primary Practice Provider: Ayan Chowdhury DO    CANCER/HEMATOLGOY HISTORY    68-year-old  female with history of left invasive ductal carcinoma diagnosed in September 2016.     Patient  moved from [place] where she was followed and treated by Danya Arthur MD.  Her last visit with oncologist in [place] was June 6, 2022.  She was diagnosed with left breast upper inner quadrant invasive ductal carcinoma grade 2 per biopsy in  September 6, 2016 ER 95%, TX -0% HER2 1+ by IHC but positive by FISH with an average HER2 copy numbers of 6.5 and a ratio of 1.6 Ki-67 was 80 to 90%.  She underwent left lower outer quadrant 6 o'clock position biopsy on October 11, 2016 which showed intraductal  papilloma and atypical ductal hyperplasia and another focus at left lower inner quadrant 7:00 biopsy showed intraductal papilloma.  August 2022 she moved  to [place].     Patient underwent left lumpectomy with a left axillary sentinel lymph node biopsy on November 8, 2016.  Final pathology showed grade 3 invasive ductal carcinoma with micropapillary features tumor measured 3 cm with margins positive for invasive ductal  carcinoma extended into the superior margin.  There was lymphovascular invasion present.  There was high-grade DCIS solid type with comedonecrosis measuring 2.5 cm with a negative margins.      Patient then underwent left breast wider excision on December 8, 2016 as well as left axillary lymph node dissection patient was found to have 5 negative lymph nodes.  Pathology showed breast tissue with biopsy site changes and no residual invasive or  in situ carcinoma.     Patient underwent adjuvant chemotherapy with Taxotere 75mg/m2, Carboplatin AUC 5, Herceptin and Perjita between January 6, 2017 and April 21, 2017.  Beginning cycle 5 of  "TCHP on March 31, 2017 the dose of Taxotere was reduced to 60mg/m2 and carbo AUC  of 4 due to febrile neutropenia and thrombocytopenia.     Patient completed adjuvant radiation therapy from 8/15/2017 through July 7, 2017     Patient completed total of 12 cycles of Herceptin between May 12, 2017 and December 29, 2017     Patient was started on letrozole 2.5 mg daily starting August 7, 2017 and discontinued April 25, 2019 due to arthralgia and hair thinning.  Patient was started on anastrozole darting from April 26, 2019 and discontinued October 24, 2019 due to arthralgia.   Patient was then started on exemestane starting October 25, 2019.  She has been getting Exemestane on free drug program and that will be ending, so she questions if she still needs to be on antihormonal therapy.  Due to  insurance purposes she will be starting back on letrozole which she has tolerated well.  She confirms she is taking this.   She had a mammogram 9/13/23 showed no malignancy.   She had sciatica and will be undergoing physical therapy.  She is otherwise doing well denies fever, chills, night sweats, decreased appetite, weight loss, chest pain, shortness of breath, nausea, vomiting, diarrhea, constipation, abdominal  pain, or urinary symptoms.  Denies any lumps or bumps.  She previously stated  she has some mild neuropathy on the bottom of her feet and in her big toes, but not a complaint today's visit.       Patient has a history of osteoporosis has been on Fosamax and self discontinued and declined Prolia in the past last DEXA scan on 8/3/23 reviewed osteopenia.  Patient has been taking vitamin D and calcium.     No colonoscopy. Fit Test yearly. Cologuard history negative.      HISTORY OF PRESENT ILLNESS     ID Statement: Mariel Mckeon is a 69 year old female     Chief Complaint: \" feel fine\"    Interval History:   Patient presents for follow up. No breast concerns. Lymphedema, rash on right forearm. Econazole cream. Following " Derm.  Performs self breast exams.   Denies F/C/recent illness/infection, drenching night sweats, unintentional weight loss, anorexia/loss of appetite, HA, dizziness, lightheadedness, acute changes in vision/hearing, palpitations, CP, SOB, n/v/d/c/abd pain, bleeding, melena, urinary issues, haematuria, LAD, peripheral neuropathy, bone pain, bruising, sores, or rashes.      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  -No other known history of hematologic, bleeding, clotting, autoimmune, genetic, or malignant disorders in the family.     OCCUPATIONAL/ENVIRONMENTAL HISTORY/EXPOSURES:  -None reported    Active Problems, Allergy List, Medication List, and PMH/PSH/FH/Social Hx have been reviewed and reconciled in chart. Updates made when necessary.     REVIEW OF SYSTEMS   A review of systems has been completed and are negative for complaints except what is stated in the assessment, HPI, IH, ROS, and/or past medical history.    ALLERGIES AND MEDICATIONS     Allergies and Intolerances:   Allergies  "  Allergen Reactions    Amoxicillin-Pot Clavulanate Unknown    Nasacort [Triamcinolone Acetonide] Unknown    Triamcinolone Other and Unknown      Medication Profile:   Current Outpatient Medications   Medication Instructions    atorvastatin (Lipitor) 10 mg tablet Take 1 tablet every other day    calcium carbonate-vit D3-min (Citrical & Minerals + Vit D) 600 mg calcium- 200 unit tablet 1 tablet, Daily    cholecalciferol (VITAMIN D-3) 25 mcg, Daily    cyanocobalamin (vitamin B-12) 1,000 mcg, Daily    econazole nitrate 1 % cream     ketoconazole (NIZOral) 2 % cream Topical, 2 times daily    [START ON 4/2/2025] letrozole (FEMARA) 2.5 mg, oral, Daily, Take with or without food.    multivit-min/iron/folic/[id number] (HAIR, SKIN AND NAILS ADVANCED ORAL)     omeprazole (PriLOSEC) 20 mg DR capsule TAKE 1 CAPSULE DAILY BEFOREA MEAL    triamcinolone (Kenalog) 0.1 % cream Topical, 2 times daily PRN      Available Vaccination Record:     There is no immunization history on file for this patient.   PHYSICAL EXAM     Vital Signs/Measurements:       3/13/2024     8:23 AM 9/13/2024    10:48 AM 9/20/2024     9:05 AM 9/25/2024     8:08 AM 10/23/2024     8:04 AM 3/14/2025     9:51 AM 3/26/2025     9:44 AM   Vitals   Systolic 132 115  144 142 125 160   Diastolic 82 79  83 83 80 84   BP Location Right arm   Right arm   Right arm   Heart Rate 78 78  79 65 87 77   Temp 36.4 °C (97.5 °F)   35.9 °C (96.6 °F)   36.4 °C (97.5 °F)   Resp 18   16   16   Height 1.687 m (5' 6.42\")   1.676 m (5' 6\")       Weight (lb) 174.27 169 169 172.18 170 174 174.16   BMI 27.78 kg/m2 26.94 kg/m2 27.28 kg/m2 27.79 kg/m2 27.44 kg/m2 28.08 kg/m2 28.11 kg/m2   BSA (m2) 1.93 m2 1.9 m2 1.89 m2 1.91 m2 1.89 m2 1.92 m2 1.92 m2   Visit Report Report Report  Report Report Report Report       Significant value      Performance:   ECOG Performance Status: 0     Grade ECOG performance status   0 Fully active, able to carry on all pre-disease performance without restriction   1 " Restricted in physically strenuous activity but ambulatory and able to carry out work of a light or sedentary nature, e.g., light housework, office work   2 Ambulatory and capable of all selfcare but unable to carry out any work activities; Up and about more than 50% of waking hours   3 Capable of only limited selfcare, confined to bed or chair more than 50% of waking hours   4 Completely disabled; Cannot carry out any selfcare; Totally confined to bed or chair   5 Dead     Physical Exam:  General: Patient is awake/alert/oriented x3, no distress, nourished, hydrated, and cooperative, ambulating without difficulty  Skin: Expected color for ethnicity, good turgor, dry, no prominent lesions, rashes, unusual bruising, or bleeding. Pink raised spot >1cm on right forearm.   Hair: Normal texture and distribution   Nails: Normal color, no deformities    HEENT:   Head: Normocephalic, atraumatic, no visible masses  Eyes: Conjunctiva clear, sclera non-icteric, no exudates or hemorrhages   Ears: nl appearance, hearing intact    Nose: no external lesions, mucosa non-inflamed, no rhinorrhea   Mouth: Mucous membranes moist, no lesions, sores, bleeding, or erythema     Head/Neck: Neck supple, no apparent injury, thyroid without mass or tenderness, No JVD, trachea midline, no bruits appreciated    Respiratory/Thorax: Patent airways, CTAB, chest symmetry, normal inspiratory and expiratory effort    Cardiovascular: Regular rate and rhythm, no murmur or gallop, no carotid bruit or thrills    Gastrointestinal: Bowel sounds normal, non-distended, soft, no tenderness, no masses or hernia, or organomegaly appreciated    Genitourinary: Deferred   Musculoskeletal: Normal gait, normal range of motion, no pain on palpation of spine, no deformity, normal strength, no atrophy, and no CVA tenderness appreciated   Extremities: No amputations or deformities, cyanosis, edema or viscosities, peripheral pulses intact   Neurological: Sensation present  to touch, intact senses, motor response and reflexes normal, normal strength   Breast: No nipple abnormality or drainage, dominant masses, tenderness to palpation, axillary, or supraclavicular adenopathy.  Left breast continues with nipple inversion since time of surgery.  No areas of concern or abnormal findings.    Lymphatic: No significant lymphadenopathy   Psychological: Intact recent and remote memory, judgement, and insight. Appropriate mood, affect, and behavior          RESULTS/DATA     Labs:     Lab Results   Component Value Date    WBC 6.1 03/26/2025    NEUTROABS 3.93 03/26/2025    IGABSOL 0.01 03/26/2025    LYMPHSABS 1.55 03/26/2025    MONOSABS 0.33 03/26/2025    EOSABS 0.22 03/26/2025    BASOSABS 0.04 03/26/2025    RBC 4.69 03/26/2025    MCV 94 03/26/2025    MCHC 33.5 03/26/2025    HGB 14.7 03/26/2025    HCT 43.9 03/26/2025     03/26/2025       Lab Results   Component Value Date    CREATININE 0.89 03/26/2025    BUN 12 03/26/2025    EGFR 71 03/26/2025     03/26/2025    K 3.9 03/26/2025     03/26/2025    CO2 26 03/26/2025      Lab Results   Component Value Date    ALT 32 03/26/2025    AST 24 03/26/2025    ALKPHOS 98 03/26/2025    BILITOT 0.6 03/26/2025     Radiology/Studies:   BI mammo bilateral screening tomosynthesis 9/20/2024  IMPRESSION:  No mammographic evidence of malignancy.      BI-RADS CATEGORY:  BI-RADS Category:  2 Benign.  Recommendation:  Annual Screening.  Recommended Date:  1 Year.  Laterality:  Bilateral.      XR DEXA bone density axial skeleton w VFA  8/3/2023  Impression   DEXA:  According to World Health Organization criteria,  classification is  low bone mass (osteopenia)     Followup recommended in two years or sooner as clinically warranted.     VFA:  NO VERTEBRAL FRACTURES WERE SEEN.     All images and detailed analysis are available on the  Radiology  PACS.        ASSESSMENT/PLAN     Assessment and Plan:   #1. Malignant neoplasm of central portion of breast in  female, estrogen receptor positive, unspecified laterality (Multi)   Left invasive ductal carcinoma s/p lumpectomy 11/8/16 of 3cm tumor with positive margins requiring reexcision with 0/5LN.  She was treated with Taxotere, Carboplatin,  Herceptin and Perjita X 6 completed 4/21/17 and finished the year of Herceptin.  She completed radiation therapy 7/7/2017.  She was started on antihormonal therapy 8/7/17 and switched to anastrazole until 10/24/19, both intolerance arthralgia.  She has  been on exemestane since 10/25/19.  She will be set switching to letrozole due to insurance request. She will no longer be getting free exemestane.  Dr Arthur suggestion to continue a full 10 years of antihormonal therapy as discussed with patient prior to her moving to [place]. She is in agreement.       History of lymphedema not noted today, continue with sleeve as needed.   Not prominent today.     Mammogram completed 09/20/2024 shows no malignancy.      #2. Osteopenia   ~DEXA Aug 2025.  ~Declined Prolia,  DEXA due Aug 2025.  ~Plan to continue full 10 years of AI, letrozole.      **Please follow with specialties as scheduled for other health needs and routine screenings.**    Follow up:    RTC:  -6 months with labs    Medications:  -Letrozole 2.5 mg daily     Imaging/Testing:  -Mammogram 9/2025  -Dexa 8/2025    Referral(s):  -NA    Other Pertinent Appointments:  -PCP 9/15/2025      Patient Discussion Summary:  Discussed plan of care in detail. Patient states understanding and in agreement to the plan. Answered all questions to there liking. The patient will call with any additional questions and/or concerns.    Thank you for allowing me to participate in your care. It was a pleasure meeting you.    Sincerely,  Rebekah Lundy, APRN-CNP     Hematology/Oncology Clinical Nurse Practitioner     [facility]   [id number]  [facility] [address] [place] [id number]  Office #: [phone]    DISCLAIMER:   In  preparing for this visit and writing this note, I reviewed all the previous electronic medical records (testing, labs, imaging, and other procedures, provider notes, and medical charts) of the patient available in the physician portal pertinent to patient care. Significant findings which helped in decision making are recorded in this chart.    This document may have been written by voice recognition software.  There may be some incorrect wording, spelling and/or spelling errors or punctuation errors that were not corrected prior to committing the note to the medical record. Please request clarification if there is documentation error or clarification is needed.   Time based billing: Please see documentation within this specific encounter.

## 2025-03-30 DIAGNOSIS — E11.9 TYPE 2 DIABETES MELLITUS WITHOUT COMPLICATION, WITHOUT LONG-TERM CURRENT USE OF INSULIN: ICD-10-CM

## 2025-03-30 DIAGNOSIS — E78.1 HYPERTRIGLYCERIDEMIA: Primary | ICD-10-CM

## 2025-03-30 RX ORDER — FENOFIBRATE 54 MG/1
54 TABLET ORAL DAILY
Qty: 90 TABLET | Refills: 1 | Status: SHIPPED | OUTPATIENT
Start: 2025-03-30 | End: 2025-09-26

## 2025-05-06 DIAGNOSIS — K21.9 GASTROESOPHAGEAL REFLUX DISEASE, UNSPECIFIED WHETHER ESOPHAGITIS PRESENT: ICD-10-CM

## 2025-05-06 RX ORDER — OMEPRAZOLE 20 MG/1
20 CAPSULE, DELAYED RELEASE ORAL
Qty: 90 CAPSULE | Refills: 1 | Status: SHIPPED | OUTPATIENT
Start: 2025-05-06

## 2025-05-25 ENCOUNTER — OFFICE VISIT (OUTPATIENT)
Dept: URGENT CARE | Age: 70
End: 2025-05-25
Payer: MEDICARE

## 2025-05-25 VITALS
HEART RATE: 78 BPM | HEIGHT: 67 IN | SYSTOLIC BLOOD PRESSURE: 136 MMHG | WEIGHT: 165 LBS | DIASTOLIC BLOOD PRESSURE: 72 MMHG | RESPIRATION RATE: 16 BRPM | TEMPERATURE: 97.8 F | OXYGEN SATURATION: 97 % | BODY MASS INDEX: 25.9 KG/M2

## 2025-05-25 DIAGNOSIS — N30.00 ACUTE CYSTITIS WITHOUT HEMATURIA: ICD-10-CM

## 2025-05-25 PROBLEM — N30.01 ACUTE CYSTITIS WITH HEMATURIA: Status: ACTIVE | Noted: 2025-05-25

## 2025-05-25 LAB
POC APPEARANCE, URINE: ABNORMAL
POC BILIRUBIN, URINE: ABNORMAL
POC BLOOD, URINE: ABNORMAL
POC COLOR, URINE: YELLOW
POC GLUCOSE, URINE: NEGATIVE MG/DL
POC KETONES, URINE: NEGATIVE MG/DL
POC LEUKOCYTES, URINE: ABNORMAL
POC NITRITE,URINE: NEGATIVE
POC PH, URINE: 5.5 PH
POC PROTEIN, URINE: ABNORMAL MG/DL
POC SPECIFIC GRAVITY, URINE: 1.02
POC UROBILINOGEN, URINE: 0.2 EU/DL

## 2025-05-25 RX ORDER — SULFAMETHOXAZOLE AND TRIMETHOPRIM 800; 160 MG/1; MG/1
1 TABLET ORAL 2 TIMES DAILY
Qty: 10 TABLET | Refills: 0 | Status: SHIPPED | OUTPATIENT
Start: 2025-05-25 | End: 2025-05-30

## 2025-05-25 NOTE — PROGRESS NOTES
"Subjective   Patient ID: Mariel Mckeon is a 70 y.o. female. They present today with a chief complaint of UTI (Sx started 2-3 days ago.  + frequency, no other symtoms).    History of Present Illness    UTI    This is a 70-year-old female here for urinary frequency.  Symptoms started 3 days ago.  She has urinary frequency.  Denies dysuria, hematuria, abdominal/flank/back pain.  She did have UTI in the fall 2024.  Denies fevers or chills.  Past Medical History  Allergies as of 05/25/2025 - Reviewed 05/25/2025   Allergen Reaction Noted    Amoxicillin-pot clavulanate Unknown 08/22/2023    Nasacort [triamcinolone acetonide] Unknown 08/22/2023    Triamcinolone Other and Unknown 08/22/2023       Prescriptions Prior to Admission[1]     Medical History[2]    Surgical History[3]     reports that she has quit smoking. Her smoking use included cigarettes. She has never used smokeless tobacco. She reports current alcohol use. She reports that she does not use drugs.    Review of Systems  Review of Systems   All other systems reviewed and are negative.                               Objective    Vitals:    05/25/25 0927   BP: 136/72   Pulse: 78   Resp: 16   Temp: 36.6 °C (97.8 °F)   SpO2: 97%   Weight: 74.8 kg (165 lb)   Height: 1.702 m (5' 7\")     No LMP recorded. Patient is postmenopausal.    Physical Exam  Physical Exam    General: Vitals noted, no distress. Afebrile.    EENT: Posterior oropharynx unremarkable.    Cardiac: Regular, rate, rhythm    Pulmonary: Lungs clear bilaterally with good aeration. No adventitious breath sounds.    Abdomen: Soft, nonsurgical. Nontender. No peritoneal signs. Normoactive bowel sounds.    Back: No CVA tenderness bilaterally.    Extremities: No peripheral edema. Neurovascularly intact throughout.    Skin: No rash.    Neuro: No gross neurological deficits.    Procedures    Point of Care Test & Imaging Results from this visit  Results for orders placed or performed in visit on 05/25/25   POCT UA " Automated manually resulted   Result Value Ref Range    POC Color, Urine Yellow Straw, Yellow, Light-Yellow    POC Appearance, Urine Cloudy (A) Clear    POC Glucose, Urine NEGATIVE NEGATIVE mg/dl    POC Bilirubin, Urine SMALL (1+) (A) NEGATIVE    POC Ketones, Urine NEGATIVE NEGATIVE mg/dl    POC Specific Gravity, Urine 1.020 1.005 - 1.035    POC Blood, Urine LARGE (3+) (A) NEGATIVE    POC PH, Urine 5.5 No Reference Range Established PH    POC Protein, Urine 30 (1+) (A) NEGATIVE mg/dl    POC Urobilinogen, Urine 0.2 0.2, 1.0 EU/DL    Poc Nitrite, Urine NEGATIVE NEGATIVE    POC Leukocytes, Urine LARGE (3+) (A) NEGATIVE      Imaging  No results found.    Cardiology, Vascular, and Other Imaging  No other imaging results found for the past 2 days      Diagnostic study results (if any) were reviewed by Grayson Pham PA-C.    Assessment/Plan   Allergies, medications, history, and pertinent labs/EKGs/Imaging reviewed by Grayson Pham PA-C.     Medical Decision Making  Summary: Patient presents to urgent care for UTI symptoms. Patient is well-appearing nontoxic on the exam. Vital signs reviewed. Differential diagnosis includes not limited to UTI, pyelonephritis, or nephrolithiasis.  Urine dip demonstrates infection. Urine sent for culture.  Urine culture reviewed from October 2024 and was pansensitive.  I will put her on Bactrim.  Stable for discharge. Follow-up with PCP. Return to urgent care or go to the emergency department if symptoms worsen or if new symptoms develop.    Orders and Diagnoses  Diagnoses and all orders for this visit:  Acute cystitis with hematuria  -     POCT UA Automated manually resulted      Medical Admin Record      Patient disposition: Home    Electronically signed by Grayson Pham PA-C  9:48 AM           [1] (Not in a hospital admission)  [2]   Past Medical History:  Diagnosis Date    Breast cancer     left side cancer   [3]   Past Surgical History:  Procedure Laterality Date    OTHER SURGICAL  HISTORY  08/19/2022    Hysterectomy    OTHER SURGICAL HISTORY  08/19/2022    Tubal ligation    OTHER SURGICAL HISTORY Left 08/19/2022    Lumpectomy for breast cancer    OTHER SURGICAL HISTORY  08/19/2022    Wrist surgery    OTHER SURGICAL HISTORY  08/19/2022    Eye surgery

## 2025-05-25 NOTE — PATIENT INSTRUCTIONS
"Urinary tract infections in adults    The Basics  Written by the doctors and editors at [facility]  What is the urinary tract?  This is the group of organs in the body that handle urine (figure 1). It includes the:  ? Kidneys - These are 2 bean-shaped organs that filter the blood to make urine.  ? Bladder - This is a balloon-shaped organ that stores urine.  ? Ureters - These are 2 tubes that carry urine from the kidneys to the bladder.  ? Urethra - This is the tube that carries urine from the bladder to the outside of the body.  What are urinary tract infections?  Urinary tract infections, or \"UTIs,\" are infections that affect either the bladder or the kidneys:  ? Bladder infections are more common than kidney infections. They happen when bacteria get into the urethra and travel up into the bladder. The medical term for bladder infection is \"cystitis.\" Most of the time, when people talk about a UTI, they mean a bladder infection.  ? Kidney infections happen when the bacteria travel even higher, up into the kidneys. The medical term for kidney infection is \"pyelonephritis.\" This is more serious than a bladder infection, and can lead to other serious problems if it is not treated properly.  Both bladder and kidney infections are more common in females than males.  The risk of UTIs is also higher in people who have a urinary catheter. A catheter is a thin, flexible tube that drains urine from the bladder. It might be used in people who are in the hospital and cannot urinate the normal way.  What are the symptoms of a bladder infection?  The symptoms include:  ? Pain or a burning feeling when you urinate  ? The need to urinate often  ? The need to urinate suddenly or in a hurry  ? Blood in the urine  What are the symptoms of a kidney infection?  The symptoms of a kidney infection can include the same urinary symptoms that happen with a bladder infection.  In addition, kidney infections can cause:  ? Fever  ? Back pain  ? " Nausea or vomiting  Will I need tests?  Maybe. If you think that you might have a UTI, call your doctor or nurse. Sometimes, they can tell if you have one just by learning about your symptoms.  Your doctor or nurse might do a simple urine test in the office. They might also do a more involved urine test to check for bacteria.  How are UTIs treated?  Most are treated with antibiotic pills. These work by killing the germs that cause the infection.  ? If you have a bladder infection, you will probably need to take antibiotics for 3 to 7 days.  ? If you have a kidney infection, you will probably need to take antibiotics for longer. Some people need to take them for 10 days. If you have a kidney infection, it's also possible that you will need to be treated in the hospital.  Your symptoms should begin to improve within a day of starting antibiotics. But you should finish all of the antibiotic pills. Otherwise, the infection might come back.  If needed, you can also take a medicine to numb your bladder. This medicine eases the pain caused by UTIs. It also reduces the need to urinate.  What if I get bladder infections a lot?  First, check with your doctor or nurse to make sure that you are really having bladder infections. The symptoms of bladder infection can be caused by other things. Your doctor or nurse will want to see if those problems might be causing your symptoms.  If your doctor confirms that you are having repeated infections, there are things you can do to keep from getting more infections. These include:  ? Drinking more fluid - This can help prevent bladder infections.  ? Vaginal estrogen - If you have already been through menopause, your doctor might suggest this. Vaginal estrogen comes in a cream or a flexible ring that you put into your vagina. It can help prevent bladder infections.  Other things that might help:  ? Avoid spermicides (sperm-killing creams or gels) - Spermicide is a form of birth control.  "It seems to increase the risk of bladder infections in some females, especially when used with a diaphragm. If you use spermicide and get a lot of bladder infections, you might want to try switching to a different form of birth control.  ? Urinate right after sex - Some doctors think this helps, because it helps flush out germs that might get into the bladder during sex. There is no proof it works, but it also cannot hurt.  If you get a lot of bladder infections, and the above methods have not helped, talk to your doctor about what else you can do to prevent infection. Taking an antibiotic every day or after sex can help prevent bladder infections. But long-term use of antibiotics has downsides, so doctors usually suggest trying other things first, such as:  ? Methenamine (brand name: Hiprex) - This is a pill you take every day. It changes your urine to make it harder for bacteria to grow. It works almost as well as antibiotics to prevent bladder infections.  ? Cranberry juice or other cranberry products - These might help prevent bladder infections. Doctors do not know exactly what the best dose is. But they sometimes suggest drinking 1 or 2 glasses of cranberry juice a day to try to help prevent UTIs. You can also buy cranberry tablets.  Can other products prevent bladder infections?  People often wonder about other \"natural\" products that claim to help prevent bladder infections. These include probiotic pills, vitamin C, and D-mannose. There is not good evidence that these things work. However, there is also no clear evidence that they are harmful. If you have questions about these or other products, talk with your doctor or nurse.   "

## 2025-05-27 LAB — BACTERIA UR CULT: ABNORMAL

## 2025-07-29 DIAGNOSIS — E78.5 HYPERLIPIDEMIA, UNSPECIFIED HYPERLIPIDEMIA TYPE: ICD-10-CM

## 2025-07-30 RX ORDER — ATORVASTATIN CALCIUM 10 MG/1
10 TABLET, FILM COATED ORAL EVERY OTHER DAY
Qty: 45 TABLET | Refills: 1 | Status: SHIPPED | OUTPATIENT
Start: 2025-07-30

## 2025-09-02 ENCOUNTER — HOSPITAL ENCOUNTER (OUTPATIENT)
Dept: RADIOLOGY | Facility: HOSPITAL | Age: 70
Discharge: HOME | End: 2025-09-02
Payer: MEDICARE

## 2025-09-02 DIAGNOSIS — C50.011 BILATERAL MALIGNANT NEOPLASM OF AREOLA OF BREAST IN FEMALE, UNSPECIFIED ESTROGEN RECEPTOR STATUS: ICD-10-CM

## 2025-09-02 DIAGNOSIS — C50.119 MALIGNANT NEOPLASM OF CENTRAL PORTION OF BREAST IN FEMALE, ESTROGEN RECEPTOR POSITIVE, UNSPECIFIED LATERALITY: ICD-10-CM

## 2025-09-02 DIAGNOSIS — C50.012 BILATERAL MALIGNANT NEOPLASM OF AREOLA OF BREAST IN FEMALE, UNSPECIFIED ESTROGEN RECEPTOR STATUS: ICD-10-CM

## 2025-09-02 DIAGNOSIS — M85.88 OSTEOPENIA OF OTHER SITE: ICD-10-CM

## 2025-09-02 DIAGNOSIS — M85.80 OSTEOPENIA, UNSPECIFIED LOCATION: ICD-10-CM

## 2025-09-02 DIAGNOSIS — Z17.0 MALIGNANT NEOPLASM OF CENTRAL PORTION OF BREAST IN FEMALE, ESTROGEN RECEPTOR POSITIVE, UNSPECIFIED LATERALITY: ICD-10-CM

## 2025-09-02 PROCEDURE — 77085 DXA BONE DENSITY AXL VRT FX: CPT

## 2025-09-02 PROCEDURE — 77085 DXA BONE DENSITY AXL VRT FX: CPT | Performed by: RADIOLOGY

## 2025-09-15 ENCOUNTER — APPOINTMENT (OUTPATIENT)
Dept: PRIMARY CARE | Facility: CLINIC | Age: 70
End: 2025-09-15
Payer: MEDICARE